# Patient Record
Sex: FEMALE | Race: WHITE | NOT HISPANIC OR LATINO | Employment: FULL TIME | URBAN - METROPOLITAN AREA
[De-identification: names, ages, dates, MRNs, and addresses within clinical notes are randomized per-mention and may not be internally consistent; named-entity substitution may affect disease eponyms.]

---

## 2017-04-19 ENCOUNTER — COMMUNICATION - HEALTHEAST (OUTPATIENT)
Dept: FAMILY MEDICINE | Facility: CLINIC | Age: 37
End: 2017-04-19

## 2017-04-20 ENCOUNTER — COMMUNICATION - HEALTHEAST (OUTPATIENT)
Dept: HEALTH INFORMATION MANAGEMENT | Facility: CLINIC | Age: 37
End: 2017-04-20

## 2017-05-02 ENCOUNTER — OFFICE VISIT - HEALTHEAST (OUTPATIENT)
Dept: FAMILY MEDICINE | Facility: CLINIC | Age: 37
End: 2017-05-02

## 2017-05-02 DIAGNOSIS — Z01.818 PREOP EXAMINATION: ICD-10-CM

## 2017-05-02 DIAGNOSIS — M67.431 GANGLION CYST OF DORSUM OF RIGHT WRIST: ICD-10-CM

## 2017-05-02 ASSESSMENT — MIFFLIN-ST. JEOR: SCORE: 1517.71

## 2017-08-17 ENCOUNTER — RECORDS - HEALTHEAST (OUTPATIENT)
Dept: ADMINISTRATIVE | Facility: OTHER | Age: 37
End: 2017-08-17

## 2017-08-18 ENCOUNTER — RECORDS - HEALTHEAST (OUTPATIENT)
Dept: ADMINISTRATIVE | Facility: OTHER | Age: 37
End: 2017-08-18

## 2017-09-11 ENCOUNTER — HOSPITAL ENCOUNTER (OUTPATIENT)
Dept: ULTRASOUND IMAGING | Facility: CLINIC | Age: 37
Discharge: HOME | End: 2017-09-11
Attending: OBSTETRICS & GYNECOLOGY

## 2017-09-11 ENCOUNTER — HOSPITAL ENCOUNTER (OUTPATIENT)
Dept: MAMMOGRAPHY | Facility: CLINIC | Age: 37
Discharge: HOME | End: 2017-09-11
Attending: OBSTETRICS & GYNECOLOGY

## 2017-09-11 DIAGNOSIS — N64.4 BREAST PAIN, RIGHT: ICD-10-CM

## 2017-09-11 DIAGNOSIS — N64.4 BREAST PAIN, LEFT: ICD-10-CM

## 2017-10-16 ENCOUNTER — RECORDS - HEALTHEAST (OUTPATIENT)
Dept: ADMINISTRATIVE | Facility: OTHER | Age: 37
End: 2017-10-16

## 2017-10-26 ENCOUNTER — OFFICE VISIT - HEALTHEAST (OUTPATIENT)
Dept: FAMILY MEDICINE | Facility: CLINIC | Age: 37
End: 2017-10-26

## 2017-10-26 DIAGNOSIS — R06.02 SHORTNESS OF BREATH: ICD-10-CM

## 2017-10-26 DIAGNOSIS — R41.3 MEMORY LOSS: ICD-10-CM

## 2017-10-26 DIAGNOSIS — R51.9 CHRONIC DAILY HEADACHE: ICD-10-CM

## 2017-11-06 ENCOUNTER — HOSPITAL ENCOUNTER (OUTPATIENT)
Dept: MRI IMAGING | Facility: CLINIC | Age: 37
Discharge: HOME | End: 2017-11-06
Attending: FAMILY MEDICINE

## 2017-11-06 ENCOUNTER — HOSPITAL ENCOUNTER (OUTPATIENT)
Dept: MRI IMAGING | Facility: CLINIC | Age: 37
Discharge: HOME | End: 2017-11-06
Attending: ORTHOPAEDIC SURGERY

## 2017-11-06 ENCOUNTER — RECORDS - HEALTHEAST (OUTPATIENT)
Dept: ADMINISTRATIVE | Facility: OTHER | Age: 37
End: 2017-11-06

## 2017-11-06 DIAGNOSIS — R41.3 MEMORY LOSS: ICD-10-CM

## 2017-11-06 DIAGNOSIS — R51.9 CHRONIC DAILY HEADACHE: ICD-10-CM

## 2017-11-06 DIAGNOSIS — M54.2 NECK PAIN: ICD-10-CM

## 2017-11-16 ENCOUNTER — OFFICE VISIT - HEALTHEAST (OUTPATIENT)
Dept: FAMILY MEDICINE | Facility: CLINIC | Age: 37
End: 2017-11-16

## 2017-11-16 DIAGNOSIS — R51.9 HEADACHE: ICD-10-CM

## 2017-11-20 ENCOUNTER — RECORDS - HEALTHEAST (OUTPATIENT)
Dept: ADMINISTRATIVE | Facility: OTHER | Age: 37
End: 2017-11-20

## 2017-11-20 LAB — ANA SER QL: 3.6 U

## 2017-11-21 ENCOUNTER — COMMUNICATION - HEALTHEAST (OUTPATIENT)
Dept: FAMILY MEDICINE | Facility: CLINIC | Age: 37
End: 2017-11-21

## 2017-11-21 DIAGNOSIS — R51.9 CHRONIC DAILY HEADACHE: ICD-10-CM

## 2017-11-21 DIAGNOSIS — R76.8 ELEVATED ANTINUCLEAR ANTIBODY (ANA) LEVEL: ICD-10-CM

## 2017-11-21 LAB — DNA (DS) ANTIBODY - HISTORICAL: 4 IU

## 2017-12-04 ENCOUNTER — OFFICE VISIT - HEALTHEAST (OUTPATIENT)
Dept: FAMILY MEDICINE | Facility: CLINIC | Age: 37
End: 2017-12-04

## 2017-12-04 DIAGNOSIS — R51.9 HEADACHE: ICD-10-CM

## 2017-12-22 ENCOUNTER — RECORDS - HEALTHEAST (OUTPATIENT)
Dept: ADMINISTRATIVE | Facility: OTHER | Age: 37
End: 2017-12-22

## 2018-01-15 ENCOUNTER — RECORDS - HEALTHEAST (OUTPATIENT)
Dept: GENERAL RADIOLOGY | Facility: CLINIC | Age: 38
End: 2018-01-15

## 2018-01-15 ENCOUNTER — OFFICE VISIT - HEALTHEAST (OUTPATIENT)
Dept: RHEUMATOLOGY | Facility: CLINIC | Age: 38
End: 2018-01-15

## 2018-01-15 ENCOUNTER — AMBULATORY - HEALTHEAST (OUTPATIENT)
Dept: RHEUMATOLOGY | Facility: CLINIC | Age: 38
End: 2018-01-15

## 2018-01-15 DIAGNOSIS — M79.7 FIBROMYALGIA: ICD-10-CM

## 2018-01-15 DIAGNOSIS — M54.2 CHRONIC NECK PAIN: ICD-10-CM

## 2018-01-15 DIAGNOSIS — G89.29 CHRONIC NECK PAIN: ICD-10-CM

## 2018-01-15 DIAGNOSIS — G89.29 OTHER CHRONIC PAIN: ICD-10-CM

## 2018-01-15 DIAGNOSIS — Z86.39 HISTORY OF VITAMIN D DEFICIENCY: ICD-10-CM

## 2018-01-15 DIAGNOSIS — M54.6 PAIN IN THORACIC SPINE: ICD-10-CM

## 2018-01-15 DIAGNOSIS — R20.2 RIGHT HAND PARESTHESIA: ICD-10-CM

## 2018-01-15 DIAGNOSIS — M54.50 LOW BACK PAIN: ICD-10-CM

## 2018-01-15 DIAGNOSIS — M25.50 MULTIPLE JOINT PAIN: ICD-10-CM

## 2018-01-15 DIAGNOSIS — R70.0 ELEVATED SED RATE: ICD-10-CM

## 2018-01-15 DIAGNOSIS — G89.29 CHRONIC BILATERAL THORACIC BACK PAIN: ICD-10-CM

## 2018-01-15 DIAGNOSIS — M54.50 CHRONIC BILATERAL LOW BACK PAIN WITHOUT SCIATICA: ICD-10-CM

## 2018-01-15 DIAGNOSIS — R76.8 ANA POSITIVE: ICD-10-CM

## 2018-01-15 DIAGNOSIS — M25.50 PAIN IN UNSPECIFIED JOINT: ICD-10-CM

## 2018-01-15 DIAGNOSIS — G89.29 CHRONIC BILATERAL LOW BACK PAIN WITHOUT SCIATICA: ICD-10-CM

## 2018-01-15 DIAGNOSIS — M54.6 CHRONIC BILATERAL THORACIC BACK PAIN: ICD-10-CM

## 2018-01-15 DIAGNOSIS — N96 HISTORY OF MULTIPLE MISCARRIAGES: ICD-10-CM

## 2018-01-15 LAB
ALBUMIN UR-MCNC: NEGATIVE MG/DL
APPEARANCE UR: CLEAR
BILIRUB UR QL STRIP: NEGATIVE
C REACTIVE PROTEIN LHE: 0.2 MG/DL (ref 0–0.8)
COLOR UR AUTO: YELLOW
ERYTHROCYTE [SEDIMENTATION RATE] IN BLOOD BY WESTERGREN METHOD: 34 MM/HR (ref 0–20)
GLUCOSE UR STRIP-MCNC: NEGATIVE MG/DL
HGB UR QL STRIP: NEGATIVE
KETONES UR STRIP-MCNC: NEGATIVE MG/DL
LEUKOCYTE ESTERASE UR QL STRIP: NEGATIVE
NITRATE UR QL: NEGATIVE
PH UR STRIP: 7 [PH] (ref 5–8)
RHEUMATOID FACT SERPL-ACNC: <15 IU/ML (ref 0–30)
SP GR UR STRIP: 1.02 (ref 1–1.03)
UROBILINOGEN UR STRIP-ACNC: NORMAL

## 2018-01-15 ASSESSMENT — MIFFLIN-ST. JEOR: SCORE: 1524.51

## 2018-01-16 LAB
ACE SERPL-CCNC: 34 U/L (ref 9–67)
B BURGDOR IGG+IGM SER QL: 0.1 INDEX VALUE
CCP AB SER IA-ACNC: 0.5 U/ML

## 2018-01-17 LAB
DRVVT, LUPUS ANTICOAGULANT - HISTORICAL: 37 SEC
HLA-B27 RESULT - HISTORICAL: NEGATIVE
INTERPRETATION: NORMAL

## 2018-01-18 ENCOUNTER — COMMUNICATION - HEALTHEAST (OUTPATIENT)
Dept: RHEUMATOLOGY | Facility: CLINIC | Age: 38
End: 2018-01-18

## 2018-01-18 DIAGNOSIS — R93.7 ABNORMAL X-RAY OF SPINE: ICD-10-CM

## 2018-02-13 ENCOUNTER — RECORDS - HEALTHEAST (OUTPATIENT)
Dept: ADMINISTRATIVE | Facility: OTHER | Age: 38
End: 2018-02-13

## 2018-03-21 ENCOUNTER — OFFICE VISIT - HEALTHEAST (OUTPATIENT)
Dept: RHEUMATOLOGY | Facility: CLINIC | Age: 38
End: 2018-03-21

## 2018-03-21 DIAGNOSIS — M70.62 TROCHANTERIC BURSITIS OF BOTH HIPS: ICD-10-CM

## 2018-03-21 DIAGNOSIS — E55.9 VITAMIN D DEFICIENCY: ICD-10-CM

## 2018-03-21 DIAGNOSIS — G89.29 CHRONIC PAIN OF RIGHT WRIST: ICD-10-CM

## 2018-03-21 DIAGNOSIS — R76.8 ANA POSITIVE: ICD-10-CM

## 2018-03-21 DIAGNOSIS — M25.531 CHRONIC PAIN OF RIGHT WRIST: ICD-10-CM

## 2018-03-21 DIAGNOSIS — M70.61 TROCHANTERIC BURSITIS OF BOTH HIPS: ICD-10-CM

## 2018-03-21 DIAGNOSIS — R70.0 ELEVATED SED RATE: ICD-10-CM

## 2018-03-21 DIAGNOSIS — M79.7 FIBROMYALGIA: ICD-10-CM

## 2018-03-21 DIAGNOSIS — M77.11 RIGHT LATERAL EPICONDYLITIS: ICD-10-CM

## 2018-03-21 DIAGNOSIS — M46.07 SPINAL ENTHESOPATHY, LUMBOSACRAL REGION (H): ICD-10-CM

## 2018-03-21 DIAGNOSIS — R45.89 FEELING DOWN: ICD-10-CM

## 2018-03-21 ASSESSMENT — MIFFLIN-ST. JEOR: SCORE: 1515.44

## 2018-03-26 ENCOUNTER — OFFICE VISIT - HEALTHEAST (OUTPATIENT)
Dept: RHEUMATOLOGY | Facility: CLINIC | Age: 38
End: 2018-03-26

## 2018-03-26 DIAGNOSIS — M70.61 TROCHANTERIC BURSITIS OF BOTH HIPS: ICD-10-CM

## 2018-03-26 DIAGNOSIS — M70.62 TROCHANTERIC BURSITIS OF BOTH HIPS: ICD-10-CM

## 2018-03-26 DIAGNOSIS — M46.07 SPINAL ENTHESOPATHY, LUMBOSACRAL REGION (H): ICD-10-CM

## 2018-04-09 ENCOUNTER — COMMUNICATION - HEALTHEAST (OUTPATIENT)
Dept: FAMILY MEDICINE | Facility: CLINIC | Age: 38
End: 2018-04-09

## 2018-04-09 DIAGNOSIS — M79.7 FIBROMYALGIA: ICD-10-CM

## 2018-04-16 ENCOUNTER — RECORDS - HEALTHEAST (OUTPATIENT)
Dept: ADMINISTRATIVE | Facility: OTHER | Age: 38
End: 2018-04-16

## 2018-04-23 ENCOUNTER — OFFICE VISIT - HEALTHEAST (OUTPATIENT)
Dept: RHEUMATOLOGY | Facility: CLINIC | Age: 38
End: 2018-04-23

## 2018-04-23 ENCOUNTER — OFFICE VISIT - HEALTHEAST (OUTPATIENT)
Dept: PSYCHOLOGY | Facility: CLINIC | Age: 38
End: 2018-04-23

## 2018-04-23 DIAGNOSIS — M79.7 FIBROMYALGIA: ICD-10-CM

## 2018-04-23 DIAGNOSIS — F43.23 ADJUSTMENT DISORDER WITH MIXED ANXIETY AND DEPRESSED MOOD: ICD-10-CM

## 2018-07-16 ENCOUNTER — AMBULATORY - HEALTHEAST (OUTPATIENT)
Dept: LAB | Facility: CLINIC | Age: 38
End: 2018-07-16

## 2018-07-16 DIAGNOSIS — M79.7 FIBROMYALGIA: ICD-10-CM

## 2018-07-16 DIAGNOSIS — R70.0 ELEVATED SED RATE: ICD-10-CM

## 2018-07-16 DIAGNOSIS — E55.9 VITAMIN D DEFICIENCY: ICD-10-CM

## 2018-07-16 LAB
ALT SERPL W P-5'-P-CCNC: 21 U/L (ref 0–45)
AST SERPL W P-5'-P-CCNC: 21 U/L (ref 0–40)
C REACTIVE PROTEIN LHE: 0.2 MG/DL (ref 0–0.8)
CREAT SERPL-MCNC: 0.78 MG/DL (ref 0.6–1.1)
ERYTHROCYTE [SEDIMENTATION RATE] IN BLOOD BY WESTERGREN METHOD: 32 MM/HR (ref 0–20)
GFR SERPL CREATININE-BSD FRML MDRD: >60 ML/MIN/1.73M2

## 2018-07-17 LAB — 25(OH)D3 SERPL-MCNC: 25 NG/ML (ref 30–80)

## 2018-07-23 ENCOUNTER — COMMUNICATION - HEALTHEAST (OUTPATIENT)
Dept: RHEUMATOLOGY | Facility: CLINIC | Age: 38
End: 2018-07-23

## 2018-07-23 ENCOUNTER — OFFICE VISIT - HEALTHEAST (OUTPATIENT)
Dept: RHEUMATOLOGY | Facility: CLINIC | Age: 38
End: 2018-07-23

## 2018-07-23 DIAGNOSIS — M54.6 CHRONIC BILATERAL THORACIC BACK PAIN: ICD-10-CM

## 2018-07-23 DIAGNOSIS — G89.29 CHRONIC BILATERAL THORACIC BACK PAIN: ICD-10-CM

## 2018-07-23 DIAGNOSIS — M25.561 CHRONIC PAIN OF RIGHT KNEE: ICD-10-CM

## 2018-07-23 DIAGNOSIS — M51.44 SCHMORL'S NODES OF THORACIC REGION: ICD-10-CM

## 2018-07-23 DIAGNOSIS — F40.240 CLAUSTROPHOBIA: ICD-10-CM

## 2018-07-23 DIAGNOSIS — R20.2 PARESTHESIA OF RIGHT ARM: ICD-10-CM

## 2018-07-23 DIAGNOSIS — E55.9 VITAMIN D DEFICIENCY: ICD-10-CM

## 2018-07-23 DIAGNOSIS — G89.29 CHRONIC PAIN OF RIGHT KNEE: ICD-10-CM

## 2018-07-23 DIAGNOSIS — R70.0 ESR RAISED: ICD-10-CM

## 2018-07-23 DIAGNOSIS — M79.7 FIBROMYALGIA: ICD-10-CM

## 2018-07-23 DIAGNOSIS — R76.8 ANA POSITIVE: ICD-10-CM

## 2018-07-23 LAB — ERYTHROCYTE [SEDIMENTATION RATE] IN BLOOD BY WESTERGREN METHOD: 32 MM/HR (ref 0–20)

## 2018-07-23 ASSESSMENT — MIFFLIN-ST. JEOR: SCORE: 1492.76

## 2018-07-26 ENCOUNTER — HOSPITAL ENCOUNTER (OUTPATIENT)
Dept: MRI IMAGING | Facility: CLINIC | Age: 38
Discharge: HOME | End: 2018-07-26
Attending: INTERNAL MEDICINE

## 2018-07-26 DIAGNOSIS — G89.29 CHRONIC BILATERAL THORACIC BACK PAIN: ICD-10-CM

## 2018-07-26 DIAGNOSIS — M51.44 SCHMORL'S NODES OF THORACIC REGION: ICD-10-CM

## 2018-07-26 DIAGNOSIS — M54.6 CHRONIC BILATERAL THORACIC BACK PAIN: ICD-10-CM

## 2018-07-26 DIAGNOSIS — R20.2 PARESTHESIA OF RIGHT ARM: ICD-10-CM

## 2018-08-06 ENCOUNTER — COMMUNICATION - HEALTHEAST (OUTPATIENT)
Dept: RHEUMATOLOGY | Facility: CLINIC | Age: 38
End: 2018-08-06

## 2018-08-06 DIAGNOSIS — R20.2 PARESTHESIAS: ICD-10-CM

## 2018-08-06 DIAGNOSIS — M54.6 THORACIC BACK PAIN: ICD-10-CM

## 2018-08-06 DIAGNOSIS — M51.44 SCHMORL'S NODES OF THE THORACIC REGION: ICD-10-CM

## 2018-08-21 ENCOUNTER — RECORDS - HEALTHEAST (OUTPATIENT)
Dept: ADMINISTRATIVE | Facility: OTHER | Age: 38
End: 2018-08-21

## 2018-09-09 ENCOUNTER — HOSPITAL ENCOUNTER (EMERGENCY)
Facility: CLINIC | Age: 38
Discharge: HOME | End: 2018-09-09
Attending: EMERGENCY MEDICINE | Admitting: EMERGENCY MEDICINE
Payer: COMMERCIAL

## 2018-09-09 VITALS
HEART RATE: 87 BPM | OXYGEN SATURATION: 100 % | RESPIRATION RATE: 20 BRPM | SYSTOLIC BLOOD PRESSURE: 118 MMHG | DIASTOLIC BLOOD PRESSURE: 78 MMHG

## 2018-09-09 DIAGNOSIS — H57.02 PUPIL ASYMMETRY: ICD-10-CM

## 2018-09-09 DIAGNOSIS — S05.91XA EYE INJURY, NON-PENETRATING, RIGHT, INITIAL ENCOUNTER: ICD-10-CM

## 2018-09-09 DIAGNOSIS — S05.01XA ABRASION OF RIGHT CORNEA, INITIAL ENCOUNTER: ICD-10-CM

## 2018-09-09 PROCEDURE — 99283 EMERGENCY DEPT VISIT LOW MDM: CPT | Mod: Z6 | Performed by: EMERGENCY MEDICINE

## 2018-09-09 PROCEDURE — 25000125 ZZHC RX 250: Performed by: EMERGENCY MEDICINE

## 2018-09-09 PROCEDURE — 99283 EMERGENCY DEPT VISIT LOW MDM: CPT

## 2018-09-09 PROCEDURE — 25000132 ZZH RX MED GY IP 250 OP 250 PS 637: Performed by: EMERGENCY MEDICINE

## 2018-09-09 PROCEDURE — 25000125 ZZHC RX 250: Performed by: FAMILY MEDICINE

## 2018-09-09 RX ORDER — TETRACAINE HYDROCHLORIDE 5 MG/ML
1-2 SOLUTION OPHTHALMIC ONCE
Status: COMPLETED | OUTPATIENT
Start: 2018-09-09 | End: 2018-09-09

## 2018-09-09 RX ORDER — TETRACAINE HYDROCHLORIDE 5 MG/ML
2 SOLUTION OPHTHALMIC ONCE
Status: DISCONTINUED | OUTPATIENT
Start: 2018-09-09 | End: 2018-09-09 | Stop reason: HOSPADM

## 2018-09-09 RX ORDER — OXYCODONE AND ACETAMINOPHEN 5; 325 MG/1; MG/1
2 TABLET ORAL EVERY 4 HOURS PRN
Status: DISCONTINUED | OUTPATIENT
Start: 2018-09-09 | End: 2018-09-09 | Stop reason: HOSPADM

## 2018-09-09 RX ORDER — CYCLOPENTOLATE HYDROCHLORIDE 10 MG/ML
3 SOLUTION/ DROPS OPHTHALMIC ONCE
Status: COMPLETED | OUTPATIENT
Start: 2018-09-09 | End: 2018-09-09

## 2018-09-09 RX ORDER — GENTAMICIN SULFATE 3 MG/ML
1 SOLUTION/ DROPS OPHTHALMIC
Status: DISCONTINUED | OUTPATIENT
Start: 2018-09-09 | End: 2018-09-09 | Stop reason: HOSPADM

## 2018-09-09 RX ADMIN — GENTAMICIN SULFATE 1 DROP: 3 SOLUTION OPHTHALMIC at 19:53

## 2018-09-09 RX ADMIN — OXYCODONE HYDROCHLORIDE AND ACETAMINOPHEN 2 TABLET: 5; 325 TABLET ORAL at 19:04

## 2018-09-09 RX ADMIN — CYCLOPENTOLATE HYDROCHLORIDE 3 DROP: 10 SOLUTION/ DROPS OPHTHALMIC at 18:35

## 2018-09-09 RX ADMIN — TETRACAINE HYDROCHLORIDE 1 DROP: 5 SOLUTION OPHTHALMIC at 17:01

## 2018-09-09 ASSESSMENT — ENCOUNTER SYMPTOMS
ABDOMINAL PAIN: 0
FEVER: 0
COLOR CHANGE: 0
HEADACHES: 0
NECK STIFFNESS: 0
DIFFICULTY URINATING: 0
ARTHRALGIAS: 0
SHORTNESS OF BREATH: 0
CONFUSION: 0
EYE REDNESS: 0

## 2018-09-09 NOTE — ED AVS SNAPSHOT
[facility] Emergency Department    [address] [address]    Phone:  [phone]    Fax:  [phone]                                       Sridevi Donaldson   MRN: [id number]    Department:  [facility] Emergency Department   Date of Visit:  9/9/2018           Patient Information     Date Of Birth          1980        Your diagnoses for this visit were:     Eye injury, non-penetrating, right, initial encounter     Abrasion of right cornea, initial encounter     Pupil asymmetry        You were seen by Mo Reeves DO.        Discharge Instructions       Keep eye shield applied.  Remove 3 times daily to allow for application of the gentamicin ophthalmic antibiotic drops.  Apply 2 drops into the eye then reapply the eye shield.  May use extra strength Tylenol for discomfort.  Avoid light it will cause increased pain/sensitivity.  Contact your eye care specialist tomorrow morning for appointment tomorrow.  If you are not able to get an appointment with your eye care specialist tomorrow please call [facility] which is within the [facility] facility.  Phone number is [phone].  Request a work in visit on Monday for follow-up to emergency room visit.( blunt eye injury)        24 Hour Appointment Hotline       To make an appointment at any [facility] clinic, call [phone] ([phone]). If you don't have a family doctor or clinic, we will help you find one. [facility] clinics are conveniently located to serve the needs of you and your family.             Review of your medicines      Notice     You have not been prescribed any medications.            Orders Needing Specimen Collection     None      Pending Results     No orders found from 9/7/2018 to 9/10/2018.            Pending Culture Results     No orders found from 9/7/2018 to 9/10/2018.            Pending Results Instructions     If you had any lab results that were not finalized at  "the time of your Discharge, you can call the ED Lab Result RN at [phone]. You will be contacted by this team for any positive Lab results or changes in treatment. The nurses are available 7 days a week from 10A to 6:30P.  You can leave a message 24 hours per day and they will return your call.        Test Results From Your Hospital Stay               Thank you for choosing [facility]       Thank you for choosing [facility] for your care. Our goal is always to provide you with excellent care. Hearing back from our patients is one way we can continue to improve our services. Please take a few minutes to complete the written survey that you may receive in the mail after you visit with us. Thank you!        [employer] Information     [employer] lets you send messages to your doctor, view your test results, renew your prescriptions, schedule appointments and more. To sign up, go to [email or web address] . Click on \"Log in\" on the left side of the screen, which will take you to the Welcome page. Then click on \"Sign up Now\" on the right side of the page.     You will be asked to enter the access code listed below, as well as some personal information. Please follow the directions to create your username and password.     Your access code is: [email or web address]  Expires: 2018  7:46 PM     Your access code will  in 90 days. If you need help or a new code, please call your [facility] clinic or [phone].        Care EveryWhere ID     This is your Care EveryWhere ID. This could be used by other organizations to access your [facility] medical records  [id number]        Equal Access to Services     YULIA TREVIZO : Hadii polly Frey, waantonio thrasheradaha, qaybta kaaljudith gautam . So [facility] [phone].    ATENCIÓN: Si habla español, tiene a murray disposición servicios gratuitos de asistencia lingüística. Llame al [phone].    We comply with applicable federal civil rights laws " and [place] laws. We do not discriminate on the basis of race, color, national origin, age, disability, sex, sexual orientation, or gender identity.            After Visit Summary       This is your record. Keep this with you and show to your community pharmacist(s) and doctor(s) at your next visit.

## 2018-09-09 NOTE — ED NOTES
Patient right eye bruised red and swollen. She is unable to do a visual acuity. Tetracaine instilled and lights dimmed. Spouse at bedside.

## 2018-09-09 NOTE — LETTER
September 9, 2018      To Whom It May Concern:      Sridevi Donaldson was seen in our Emergency Department today, 09/09/18.  I expect her condition to improve over the next 1-2 days.  She may return to work/school when improved.    Sincerely,      Dr. Heriberto Mosquera RN

## 2018-09-09 NOTE — ED NOTES
Patient states she has a history of Migraines and is seeing a neurologist she feels as though a migraine is coming on. MD made aware.

## 2018-09-09 NOTE — ED AVS SNAPSHOT
[facility] Emergency Department    [address] [address]    Phone:  [phone]    Fax:  [phone]                                       Sridevi Donaldson   MRN: [id number]    Department:  [facility] Emergency Department   Date of Visit:  9/9/2018           After Visit Summary Signature Page     I have received my discharge instructions, and my questions have been answered. I have discussed any challenges I see with this plan with the nurse or doctor.    ..........................................................................................................................................  Patient/Patient Representative Signature      ..........................................................................................................................................  Patient Representative Print Name and Relationship to Patient    ..................................................               ................................................  Date                                            Time    ..........................................................................................................................................  Reviewed by Signature/Title    ...................................................              ..............................................  Date                                                            Time          22EPIC Rev 08/18
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
## 2018-09-10 NOTE — ED NOTES
Ice pack applied to patient eye per request. Awaiting dilation of eye for exam. Pharmacy did call to change antibiotic ointment to drops, as pharmacy does not carry ointment. Will update MD.

## 2018-09-10 NOTE — DISCHARGE INSTRUCTIONS
Keep eye shield applied.  Remove 3 times daily to allow for application of the gentamicin ophthalmic antibiotic drops.  Apply 2 drops into the eye then reapply the eye shield.  May use extra strength Tylenol for discomfort.  Avoid light it will cause increased pain/sensitivity.  Contact your eye care specialist tomorrow morning for appointment tomorrow.  If you are not able to get an appointment with your eye care specialist tomorrow please call [facility] which is within the [facility] facility.  Phone number is [phone].  Request a work in visit on Monday for follow-up to emergency room visit.( blunt eye injury)

## 2018-10-09 ENCOUNTER — APPOINTMENT (OUTPATIENT)
Dept: OCCUPATIONAL MEDICINE | Facility: CLINIC | Age: 38
End: 2018-10-09

## 2018-10-09 PROCEDURE — 97799 UNLISTED PHYSCL MED/REHAB PX: CPT | Performed by: FAMILY MEDICINE

## 2018-10-09 PROCEDURE — 99000 SPECIMEN HANDLING OFFICE-LAB: CPT | Performed by: FAMILY MEDICINE

## 2018-10-09 PROCEDURE — 99499 UNLISTED E&M SERVICE: CPT | Performed by: FAMILY MEDICINE

## 2018-10-11 ENCOUNTER — COMMUNICATION - HEALTHEAST (OUTPATIENT)
Dept: FAMILY MEDICINE | Facility: CLINIC | Age: 38
End: 2018-10-11

## 2019-04-11 ENCOUNTER — HOSPITAL ENCOUNTER (EMERGENCY)
Facility: CLINIC | Age: 39
Discharge: HOME | End: 2019-04-11
Attending: FAMILY MEDICINE | Admitting: FAMILY MEDICINE
Payer: COMMERCIAL

## 2019-04-11 VITALS
SYSTOLIC BLOOD PRESSURE: 106 MMHG | WEIGHT: 200 LBS | HEIGHT: 64 IN | OXYGEN SATURATION: 95 % | BODY MASS INDEX: 34.15 KG/M2 | RESPIRATION RATE: 16 BRPM | HEART RATE: 77 BPM | TEMPERATURE: 97.8 F | DIASTOLIC BLOOD PRESSURE: 69 MMHG

## 2019-04-11 DIAGNOSIS — M53.3 SI (SACROILIAC) JOINT DYSFUNCTION: ICD-10-CM

## 2019-04-11 PROCEDURE — 25000131 ZZH RX MED GY IP 250 OP 636 PS 637: Performed by: FAMILY MEDICINE

## 2019-04-11 PROCEDURE — 25000132 ZZH RX MED GY IP 250 OP 250 PS 637: Performed by: FAMILY MEDICINE

## 2019-04-11 PROCEDURE — 99283 EMERGENCY DEPT VISIT LOW MDM: CPT | Performed by: FAMILY MEDICINE

## 2019-04-11 PROCEDURE — 99284 EMERGENCY DEPT VISIT MOD MDM: CPT | Mod: Z6 | Performed by: FAMILY MEDICINE

## 2019-04-11 RX ORDER — LIDOCAINE 4 G/G
1 PATCH TOPICAL
Status: DISCONTINUED | OUTPATIENT
Start: 2019-04-11 | End: 2019-04-11 | Stop reason: HOSPADM

## 2019-04-11 RX ORDER — IBUPROFEN 400 MG/1
800 TABLET, FILM COATED ORAL ONCE
Status: COMPLETED | OUTPATIENT
Start: 2019-04-11 | End: 2019-04-11

## 2019-04-11 RX ORDER — CYCLOBENZAPRINE HCL 10 MG
10 TABLET ORAL
Qty: 10 TABLET | Refills: 0 | Status: SHIPPED | OUTPATIENT
Start: 2019-04-11 | End: 2021-08-30

## 2019-04-11 RX ORDER — OXYCODONE AND ACETAMINOPHEN 5; 325 MG/1; MG/1
2 TABLET ORAL ONCE
Status: COMPLETED | OUTPATIENT
Start: 2019-04-11 | End: 2019-04-11

## 2019-04-11 RX ORDER — OXYCODONE HYDROCHLORIDE 5 MG/1
5 TABLET ORAL EVERY 6 HOURS PRN
Qty: 6 TABLET | Refills: 0 | Status: SHIPPED | OUTPATIENT
Start: 2019-04-11 | End: 2021-08-30

## 2019-04-11 RX ADMIN — OXYCODONE HYDROCHLORIDE AND ACETAMINOPHEN 2 TABLET: 5; 325 TABLET ORAL at 06:55

## 2019-04-11 RX ADMIN — DEXAMETHASONE 10 MG: 2 TABLET ORAL at 08:22

## 2019-04-11 RX ADMIN — IBUPROFEN 800 MG: 400 TABLET ORAL at 06:55

## 2019-04-11 RX ADMIN — LIDOCAINE 1 PATCH: 560 PATCH PERCUTANEOUS; TOPICAL; TRANSDERMAL at 08:22

## 2019-04-11 ASSESSMENT — ENCOUNTER SYMPTOMS
ABDOMINAL DISTENTION: 0
BLOOD IN STOOL: 0
SORE THROAT: 0
WEAKNESS: 1
PALPITATIONS: 0
VOMITING: 0
NAUSEA: 0
SINUS PRESSURE: 0
DIAPHORESIS: 0
SHORTNESS OF BREATH: 0
DIARRHEA: 1
FEVER: 0
ABDOMINAL PAIN: 0
CHILLS: 0
CONSTIPATION: 0
FREQUENCY: 0
WHEEZING: 0
COUGH: 0
BACK PAIN: 1
DYSURIA: 0
HEADACHES: 0

## 2019-04-11 ASSESSMENT — MIFFLIN-ST. JEOR: SCORE: 1557.19

## 2019-04-11 NOTE — ED AVS SNAPSHOT
[facility] Emergency Department  [address] [address]  Phone:  [phone]  Fax:  [phone]                                    Sridevi Donaldson   MRN: [id number]    Department:  [facility] Emergency Department   Date of Visit:  4/11/2019           After Visit Summary Signature Page    I have received my discharge instructions, and my questions have been answered. I have discussed any challenges I see with this plan with the nurse or doctor.    ..........................................................................................................................................  Patient/Patient Representative Signature      ..........................................................................................................................................  Patient Representative Print Name and Relationship to Patient    ..................................................               ................................................  Date                                   Time    ..........................................................................................................................................  Reviewed by Signature/Title    ...................................................              ..............................................  Date                                               Time          22EPIC Rev 08/18
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
## 2019-04-11 NOTE — DISCHARGE INSTRUCTIONS
ICD-10-CM    1. SI (sacroiliac) joint dysfunction vs disc M53.3 [facility] REFERRAL    You were given decadron, percocet, ibuprofen, lidocaine patch here.   Remove the lidocaine patch in 12 hours and may reaaply OTC 4% patch every 12 of 24 hours.  Maintain back range of motion.  Follow-up physical therapy tomorrow, follow-up primary care with Dr. Rosario tomorrow.  Return immed for numbness inner thighs, foot drop, incontinence urine/stool.  Radiation of pain below the knee or leg weakness not due to pain also requires urgent re-evaluation.  Take ibuprofen 600 mg every 6 hours with food or milk scheduled.  Take tylenol 1000 mg every 6 hours scheduled.  Take flexeril for spasm interfering with sleep.  Take oxycodone for breakthrough pain.

## 2019-04-11 NOTE — LETTER
April 11, 2019      To Whom It May Concern:      Sridevi Donaldson was seen in our Emergency Department today, 04/11/19.  I expect her condition to improve over the next 5 days.  She may return to work/school when improved, expected back as of 4/15/2019.   As of that time, no lifting >10-20 pounds, no overhead work, no bending, stooping for 7 days.     Sincerely,        Rashaun Olvera MD
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
## 2019-04-12 ENCOUNTER — HOSPITAL ENCOUNTER (OUTPATIENT)
Dept: PHYSICAL THERAPY | Facility: CLINIC | Age: 39
Setting detail: THERAPIES SERIES
End: 2019-04-12
Attending: FAMILY MEDICINE
Payer: COMMERCIAL

## 2019-04-12 ENCOUNTER — OFFICE VISIT (OUTPATIENT)
Dept: FAMILY MEDICINE | Facility: CLINIC | Age: 39
End: 2019-04-12
Payer: COMMERCIAL

## 2019-04-12 ENCOUNTER — TELEPHONE (OUTPATIENT)
Dept: FAMILY MEDICINE | Facility: CLINIC | Age: 39
End: 2019-04-12

## 2019-04-12 VITALS
DIASTOLIC BLOOD PRESSURE: 68 MMHG | TEMPERATURE: 98 F | HEART RATE: 100 BPM | RESPIRATION RATE: 13 BRPM | OXYGEN SATURATION: 97 % | BODY MASS INDEX: 34.67 KG/M2 | SYSTOLIC BLOOD PRESSURE: 114 MMHG | WEIGHT: 202 LBS

## 2019-04-12 DIAGNOSIS — M53.3 SACROILIAC JOINT PAIN: Primary | ICD-10-CM

## 2019-04-12 DIAGNOSIS — M53.3 SI (SACROILIAC) JOINT DYSFUNCTION: ICD-10-CM

## 2019-04-12 PROCEDURE — 97014 ELECTRIC STIMULATION THERAPY: CPT | Mod: GP | Performed by: PHYSICAL THERAPIST

## 2019-04-12 PROCEDURE — 97110 THERAPEUTIC EXERCISES: CPT | Mod: GP | Performed by: PHYSICAL THERAPIST

## 2019-04-12 PROCEDURE — 97162 PT EVAL MOD COMPLEX 30 MIN: CPT | Mod: GP | Performed by: PHYSICAL THERAPIST

## 2019-04-12 PROCEDURE — 99213 OFFICE O/P EST LOW 20 MIN: CPT | Performed by: FAMILY MEDICINE

## 2019-04-12 RX ORDER — OXYCODONE HYDROCHLORIDE 5 MG/1
5 TABLET ORAL EVERY 6 HOURS PRN
Qty: 6 TABLET | Refills: 0 | Status: CANCELLED | OUTPATIENT
Start: 2019-04-12
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
## 2019-04-12 NOTE — PROGRESS NOTES
[facility]    OUTPATIENT PHYSICAL THERAPY ORTHOPEDIC EVALUATION  PLAN OF TREATMENT FOR OUTPATIENT REHABILITATION  (COMPLETE FOR INITIAL CLAIMS ONLY)  Patient's Last Name, First Name, M.I.  YOB: 1980  Donaldson,Sridevi       Provider s Name:  [facility]   Medical Record No.  [id number]   Start of Care Date:  04/12/19   Onset Date:  04/10/19   Type:     _X__PT   ___OT   ___SLP Medical Diagnosis:  SI joint dysfunction/LBP     PT Diagnosis:  L Posterior Ilium, Unable to rule out disc.    Visits from SOC:  1      _________________________________________________________________________________  Plan of Treatment/Functional Goals:     MT, MET, Jt mobs, STM, Develop HEP, Stabilization.      E.Stim w/Ice for RAdicular sx's, Pain. Has TENS unit at home.   Goals  Goal Identifier: 1  Goal Description: STG: Pt will be able to put on her shoes and socks independently and w/o pain.   Target Date: 05/06/19    Goal Identifier: 2  Goal Description: STG: Pt will be able to walk for 1/4 mile  Target Date: 05/06/19    Goal Identifier: 3  Goal Description: STG: Pt will be able to sleep up to 6 hours sleep/night.   Target Date: 05/06/19    Goal Identifier: 4  Goal Description: LTG: Pt will be able to demosntrate a HEP to restrenghtening core and decrease chance of reoccurance.   Target Date: 05/17/19    Therapy Frequency:  3 times/week  Predicted Duration of Therapy Intervention:  5 weeks, decreasing freqyency as appropriate.     Vale Reyes, PT, MTC                 I CERTIFY THE NEED FOR THESE SERVICES FURNISHED UNDER        THIS PLAN OF TREATMENT AND WHILE UNDER MY CARE     (Physician co-signature of this document indicates review and certification of the therapy plan).                     Certification Date From:  04/12/19   Certification Date To:  05/06/19    Referring Provider:  Rashaun Olvera and Marlene,     Initial Assessment        See Epic Evaluation Start of Care  Date: 04/12/19

## 2019-04-12 NOTE — PROGRESS NOTES
SUBJECTIVE:   Sridevi Donaldson is a 39 year old female who presents to clinic today for the following   health issues:        ED/UC Followup:    Facility:  [facility]  Date of visit: 4/11/19  Reason for visit: SI joint pain and herniated disc  Current Status: having pain, started PT today         Patient is a 39-year-old female who has a history of fibromyalgia and multiple musculoskeletal issues she is here to establish care and to follow-up from an emergency room visit.  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  She reports that at some point she was unable to walk.  The pain was sharp and severe she was seen in the emergency room workup was essentially normal.  She was diagnosed with sacroiliac joint pain and given some pain medication and also some muscle relaxants.  She was also referred to physical therapy which she started today. She will need a note for work     Additional history: as documented    Reviewed  and updated as needed this visit by clinical staff         Reviewed and updated as needed this visit by Provider         There is no problem list on file for this patient.    Past Surgical History:   Procedure Laterality Date     DILATION AND CURETTAGE       foot reconstruction Right      ovarian cyst removal         Social History     Tobacco Use     Smoking status: Never Smoker     Smokeless tobacco: Never Used   Substance Use Topics     Alcohol use: Yes     Comment: occasional     History reviewed. No pertinent family history.      Current Outpatient Medications   Medication Sig Dispense Refill     cyclobenzaprine (FLEXERIL) 10 MG tablet Take 1 tablet (10 mg) by mouth nightly as needed for muscle spasms 10 tablet 0     oxyCODONE (ROXICODONE) 5 MG tablet Take 1 tablet (5 mg) by mouth every 6 hours as needed for pain 6 tablet 0     Allergies   Allergen Reactions     Morphine Other (See Comments)      Red veins, gone after benadryl     Penicillin G Hives     BP Readings from Last 3 Encounters:   04/12/19 114/68   04/11/19 106/69   09/09/18 118/78    Wt Readings from Last 3 Encounters:   04/12/19 91.6 kg (202 lb)   04/11/19 90.7 kg (200 lb)                  Labs reviewed in EPIC    ROS:  Constitutional, HEENT, cardiovascular, pulmonary, gi and gu systems are negative, except as otherwise noted.    OBJECTIVE:     /68 (BP Location: Left arm, Patient Position: Chair)   Pulse 100   Temp 98  F (36.7  C) (Tympanic)   Resp 13   Wt 91.6 kg (202 lb)   LMP  (LMP Unknown)   SpO2 97%   BMI 34.67 kg/m    Body mass index is 34.67 kg/m .  GENERAL: healthy, alert and no distress  EYES: Eyes grossly normal to inspection, PERRL and conjunctivae and sclerae normal  HENT: ear canals and TM's normal, nose and mouth without ulcers or lesions  NECK: no adenopathy, no asymmetry, masses, or scars and thyroid normal to palpation  RESP: lungs clear to auscultation - no rales, rhonchi or wheezes  CV: regular rate and rhythm, normal S1 S2, no S3 or S4, no murmur, click or rub, no peripheral edema and peripheral pulses strong  MS: decreased range of motion and tenderness to palpation on left hip.     Diagnostic Test Results:      ASSESSMENT/PLAN:       ICD-10-CM    1. Sacroiliac joint pain M53.3 ORTHO  REFERRAL   Discussed with patient options such as cortisone shots referred to sports medicine , encouraged to continue with physical therapy .    FUTURE APPOINTMENTS:       - Follow-up visit     Jay Rosario MD  [facility] [place]

## 2019-04-12 NOTE — TELEPHONE ENCOUNTER
Reason for Call:  prescription    Detailed comments: Pt is calling to refill her oxycodone.  Was in to see the PCP yesterday and states she forgot to ask.  Down to two tablets.  Would prefer [facility] to fill, but will  the prescription.    Phone Number Patient can be reached at: Home number on file [phone] (home)    Best Time: any    Can we leave a detailed message on this number? YES    Call taken on 4/12/2019 at 1:12 PM by Sarah Baig

## 2019-04-12 NOTE — LETTER
April 12, 2019      Sridevi Donaldson  [address] [place] [id number]        To Whom It May Concern:    Sridevi Donaldson  was seen on 4/12/2019  Please excuse her until 4/18/2019  due to illness.        Sincerely,        Jay Rosario MD

## 2019-04-12 NOTE — PATIENT INSTRUCTIONS
Patient Education     Understanding Sacroiliac Strain    A joint is a place where 2 bones meet. The 2 sacroiliac joints are where the hip (iliac) bones meet the bottom part of the spine (sacrum). These joints are surrounded by muscle, connective tissue, and nerves. Normally, a sacroiliac joint (SIJ) does not move very much. But it can be pushed out of alignment. The tissues around an SIJ also can be stretched or torn. This can lead to pain in the low back.     How to say it  DRC-md-NI-davidson-ak strain   Causes of sacroiliac strain  Causes of SIJ strain can include:    Stress on the SIJ from lifting weight incorrectly    Poor body mechanics and posture during sports or work activities    Damage from degenerative diseases such as arthritis    Increased pressure on the SIJ from pregnancy  Symptoms of sacroiliac strain  Symptoms of SIJ strain may include:    Aching in the low back, buttocks, or upper leg    Pain that gets worse with movement or standing for a long time, and gets better with rest    Inability to move as freely as usual    Muscle spasms in the low back  Treating sacroiliac strain  Treatment focuses on reducing pain and avoiding further injury. Treatments may include:    Prescription or over-the-counter pain medicines. These help reduce pain and swelling.    Cold packs or heat packs. These help reduce pain and swelling.    Stretching and other exercises. These improve flexibility and strength.    Physical therapy. This may include exercises or other treatments.    An SIJ belt. This medical device is worn around the hips, to make the SIJ more stable and reduce pain.    Injections of medicine. This may relieve symptoms.  Possible complications of sacroiliac strain  If the cause of the pain is not addressed, symptoms may return or get worse. Follow your healthcare provider s instructions on lifestyle changes and treating your SIJ strain.     When to call your healthcare provider  Call your healthcare provider  right away if you have any of these:    Fever of 100.4 F (38 C) or higher, or as directed    Redness or swelling    Pain that gets worse    Symptoms that don t get better with prescribed medicines, or get worse    New symptoms   Date Last Reviewed: 3/10/2016    [id number] [employer]. [address] [place] [address]. All rights reserved. This information is not intended as a substitute for professional medical care. Always follow your healthcare professional's instructions.

## 2019-04-12 NOTE — TELEPHONE ENCOUNTER
Routing refill request to provider for review/approval because:  Drug not on the [facility] refill protocol     Patient was seen in clinic today and forgot to ask for further refill on narcotic.      JA SharmaN, RN

## 2019-04-15 ENCOUNTER — HOSPITAL ENCOUNTER (OUTPATIENT)
Dept: PHYSICAL THERAPY | Facility: CLINIC | Age: 39
Setting detail: THERAPIES SERIES
End: 2019-04-15
Attending: FAMILY MEDICINE
Payer: COMMERCIAL

## 2019-04-15 PROCEDURE — 97110 THERAPEUTIC EXERCISES: CPT | Mod: GP | Performed by: PHYSICAL THERAPIST

## 2019-04-17 ENCOUNTER — HOSPITAL ENCOUNTER (OUTPATIENT)
Dept: PHYSICAL THERAPY | Facility: CLINIC | Age: 39
Setting detail: THERAPIES SERIES
End: 2019-04-17
Attending: FAMILY MEDICINE
Payer: COMMERCIAL

## 2019-04-17 PROCEDURE — 97110 THERAPEUTIC EXERCISES: CPT | Mod: GP | Performed by: PHYSICAL THERAPIST

## 2019-04-19 ENCOUNTER — ANCILLARY PROCEDURE (OUTPATIENT)
Dept: GENERAL RADIOLOGY | Facility: CLINIC | Age: 39
End: 2019-04-19
Attending: PEDIATRICS
Payer: COMMERCIAL

## 2019-04-19 ENCOUNTER — HEALTH MAINTENANCE LETTER (OUTPATIENT)
Age: 39
End: 2019-04-19

## 2019-04-19 ENCOUNTER — OFFICE VISIT (OUTPATIENT)
Dept: ORTHOPEDICS | Facility: CLINIC | Age: 39
End: 2019-04-19
Payer: COMMERCIAL

## 2019-04-19 VITALS
WEIGHT: 202 LBS | BODY MASS INDEX: 34.49 KG/M2 | HEIGHT: 64 IN | DIASTOLIC BLOOD PRESSURE: 70 MMHG | SYSTOLIC BLOOD PRESSURE: 116 MMHG

## 2019-04-19 DIAGNOSIS — M54.42 CHRONIC BILATERAL LOW BACK PAIN WITH LEFT-SIDED SCIATICA: Primary | ICD-10-CM

## 2019-04-19 DIAGNOSIS — G89.29 CHRONIC LOWER BACK PAIN: ICD-10-CM

## 2019-04-19 DIAGNOSIS — G89.29 CHRONIC BILATERAL LOW BACK PAIN WITH LEFT-SIDED SCIATICA: Primary | ICD-10-CM

## 2019-04-19 DIAGNOSIS — M54.50 CHRONIC LOWER BACK PAIN: ICD-10-CM

## 2019-04-19 PROCEDURE — 72100 X-RAY EXAM L-S SPINE 2/3 VWS: CPT

## 2019-04-19 PROCEDURE — 99243 OFF/OP CNSLTJ NEW/EST LOW 30: CPT | Performed by: PEDIATRICS

## 2019-04-19 ASSESSMENT — MIFFLIN-ST. JEOR: SCORE: 1566.27

## 2019-04-19 NOTE — PATIENT INSTRUCTIONS
Plan:  - Today's Plan of Care:  Over the counter medication: Ibuprofen (Advil) maximum of 800mg three times a day with food  Continue with physical therapy    -We also discussed other future treatment options:  MRI of the lower back    Follow Up: 4-6 weeks    If you have any further questions for your physician or physician s care team you can call [phone] and use option 3 to leave a voice message. Calls received during business hours will be returned same day.
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
## 2019-04-24 ENCOUNTER — HOSPITAL ENCOUNTER (OUTPATIENT)
Dept: PHYSICAL THERAPY | Facility: CLINIC | Age: 39
Setting detail: THERAPIES SERIES
End: 2019-04-24
Attending: FAMILY MEDICINE
Payer: COMMERCIAL

## 2019-04-24 PROCEDURE — 97110 THERAPEUTIC EXERCISES: CPT | Mod: GP | Performed by: PHYSICAL THERAPIST

## 2019-04-24 PROCEDURE — 97014 ELECTRIC STIMULATION THERAPY: CPT | Mod: GP | Performed by: PHYSICAL THERAPIST

## 2019-04-29 ENCOUNTER — HOSPITAL ENCOUNTER (OUTPATIENT)
Dept: PHYSICAL THERAPY | Facility: CLINIC | Age: 39
Setting detail: THERAPIES SERIES
End: 2019-04-29
Attending: FAMILY MEDICINE
Payer: COMMERCIAL

## 2019-04-29 PROCEDURE — 97110 THERAPEUTIC EXERCISES: CPT | Mod: GP | Performed by: PHYSICAL THERAPIST

## 2019-05-08 ENCOUNTER — TELEPHONE (OUTPATIENT)
Dept: FAMILY MEDICINE | Facility: CLINIC | Age: 39
End: 2019-05-08

## 2019-05-08 NOTE — TELEPHONE ENCOUNTER
Panel Management Review      Patient has the following on her problem list:       Composite cancer screening  Chart review shows that this patient is due/due soon for the following Pap Smear  Summary:    Patient is due/failing the following:   PAP and PHYSICAL    Action needed:   Patient needs office visit for pap and pe .    Type of outreach:    Sent Veeboxt message.    Questions for provider review:    None                                                                                                                                    Bryanna Mercer CMA     Chart routed to Care Team .

## 2019-05-23 NOTE — TELEPHONE ENCOUNTER
Preventlyhart message has not been viewed.  Left message for patient to return call to the clinic or to view her Armasighthart.

## 2019-05-23 NOTE — TELEPHONE ENCOUNTER
Patient returned the call, reminded due for pap/pe.  She will call back to schedule or schedule through [facility].

## 2019-08-15 NOTE — PROGRESS NOTES
Outpatient Physical Therapy Discharge Note     Patient: Sridevi Donaldson  : 1980    Beginning/End Dates of Reporting Period:  19 to 19 when last seen. Discharge written on 8/15/2019.  Total # of Rx sessions: 5    Referring Provider: Rashaun Olvera Diagnosis: SI joint dysfunciton and LBP      Client Self Report: Doing better. Work going well.     Objective Measurements:  Objective Measure: Amy   Details: 4/15/19 Score 9.  INITIALLY: 9    Objective Measure: HARISH   Details: 19  Score 74. INITIALLY: 80     Objective Measure: Pelvic LB alignment   Details: 19  Pelvis normal. INITIALLY: R Post Ilium in supine     Objective Measure: MMT  Details: 19  NOT Reassessed. INITIALLY:  Hip Flex Pain B 4-, L Knee Flex 4 w/ P, Knee Ext L 4 w/ P, R 5- w/ P, DF, PF, Toe Ext 5.     Objective Measure: Special Tests   Details: SLR Negative B,      Outcome Measures (most recent score):  Amy STarT    Amy STarT       Goals:  Goal Identifier 1   Goal Description STG: Pt will be able to put on her shoes and socks independently and w/o pain.  19 MET    Target Date 19   Date Met  19   Progress:     Goal Identifier 2   Goal Description STG: Pt will be able to walk for 1/4 mile 19  MET    Target Date 19   Date Met  19   Progress:     Goal Identifier 3   Goal Description STG: Pt will be able to sleep up to 6 hours sleep/night.  19  MET    Target Date 19   Date Met  19   Progress:     Goal Identifier 4   Goal Description LTG: Pt will be able to demosntrate a HEP to restrenghtening core and decrease chance of reoccurance. 19  MET    Target Date 19   Date Met  19   Progress:     Progress Toward Goals:   Progress this reporting period: All goals have been met.     Plan:  Discharge from therapy.    Discharge:    Reason for Discharge: Patient has met all goals.  Patient chooses to discontinue therapy.  Patient has failed to schedule  further appointments.    Equipment Issued:      Discharge Plan: Patient to continue home program.  Pt to follow up w/MD as appropriate.  Vale Reyes, PT, [facility] (#[id number])  [facility]           [phone]  Fax          [phone]  Appt #      [phone]

## 2019-08-15 NOTE — ADDENDUM NOTE
Encounter addended by: Vale Reyes, PT on: 8/15/2019 1:16 PM   Actions taken: Flowsheet accepted, Sign clinical note, Episode resolved

## 2019-08-22 ENCOUNTER — TELEPHONE (OUTPATIENT)
Dept: FAMILY MEDICINE | Facility: CLINIC | Age: 39
End: 2019-08-22

## 2019-09-26 NOTE — TELEPHONE ENCOUNTER
Contacted the patient, reminded she is due for pap/pe.  She is living in [place] and will be going somewhere closer to home.  Reminded her to get in with any provider for this.

## 2019-12-25 NOTE — TELEPHONE ENCOUNTER
Panel Management Review      Patient has the following on her problem list:       Composite cancer screening  Chart review shows that this patient is due/due soon for the following Pap Smear  Summary:    Patient is due/failing the following:   PAP    Action needed:   Patient needs office visit for Physical and pap.    Type of outreach:    Sent Gynzy message.    Questions for provider review:    None                                                                                                                                    Bryanna Mercer CMA     Chart routed to Care Team .         0 = independent

## 2020-03-11 ENCOUNTER — HEALTH MAINTENANCE LETTER (OUTPATIENT)
Age: 40
End: 2020-03-11

## 2020-04-28 ENCOUNTER — NURSE TRIAGE (OUTPATIENT)
Dept: NURSING | Facility: CLINIC | Age: 40
End: 2020-04-28

## 2020-04-28 NOTE — TELEPHONE ENCOUNTER
"    Additional Information    Negative: Shock suspected (e.g., cold/pale/clammy skin, too weak to stand, low BP, rapid pulse)    Negative: Sounds like a life-threatening emergency to the triager    Negative: Bruises with fever    Negative: Tiny bruises (spots or dots) of unknown cause    Negative: Bruise(s) of forehead or head    Negative: Bruise(s) of face or jaw    Negative: Followed an injury, and triager doesn't know which injury guideline to use first    Negative: Post-operative bruising    Negative: Dizziness or lightheadedness    Negative: [1] Bruise on head/face, chest, or abdomen AND [2]  taking Coumadin (warfarin) or other strong blood thinner, or known bleeding disorder (e.g., thrombocytopenia)    Negative: Unexplained bleeding from another site (e.g., gums, nose, urine) as well    Negative: Patient sounds very sick or weak to the triager    Negative: [1] Not caused by an injury AND [2] 5 or more bruises now    Negative: [1] Raised bruise AND [2] size > 2 inches (5 cm) AND [3] expanding    Negative: [1] SEVERE pain AND [2] not improved 2 hours after pain medicine/ice packs    Negative: Suspicious history for the injury    Negative: Taking Coumadin (warfarin) or other strong blood thinner, or known bleeding disorder (e.g., thrombocytopenia)    Negative: [1] Not caused by an injury AND [2] < 5 unexplained bruises    Negative: [1] After 10 days AND [2] bruise not fading    Negative: [1] After 3 weeks AND [2] bruise still present    Negative: Minor bruising at site of heparin injection  (e.g., Heparin, Lovenox, Innohep)    Negative: Bruising easily is a chronic symptom (recurrent or ongoing AND present > 4 weeks)    Minor bruise    Answer Assessment - Initial Assessment Questions  1. APPEARANCE of BRUISE: \"Describe the bruise.\"       Hit on leg last Wednesday by a softball line drive. Hit right over varicose vein  2. SIZE: \"How large is the bruise?\"       Good sized  3. NUMBER: \"How many bruises are there?\" " "      one  4. LOCATION: \"Where is the bruise located?\"       Inner lower leg  5. ONSET: \"How long ago did the bruise occur?\"       Almost a week  6. CAUSE: \"Tell me how it happened.\"      Line drive pitched softball  7. MEDICAL HISTORY: \"Do you have any medical problems that can cause easy bruising or bleeding?\" (e.g., leukemia, liver disease, recent chemotherapy)      no  8. MEDICATIONS : \"Do you take any medications which thin the blood such as: aspirin, heparin, ibuprofen (NSAIDS), Plavix, or Coumadin?\"      no  9. OTHER SYMPTOMS: \"Do you have any other symptoms?\"  (e.g., weakness, dizziness, pain, fever, nosebleed, blood in urine/stool)      no  10. PREGNANCY: \"Is there any chance you are pregnant?\" \"When was your last menstrual period?\"        no    Protocols used: BRUISES-A-AH      "

## 2020-06-02 ENCOUNTER — RECORDS - HEALTHEAST (OUTPATIENT)
Dept: ADMINISTRATIVE | Facility: OTHER | Age: 40
End: 2020-06-02

## 2021-01-03 ENCOUNTER — HEALTH MAINTENANCE LETTER (OUTPATIENT)
Age: 41
End: 2021-01-03

## 2021-04-25 ENCOUNTER — HEALTH MAINTENANCE LETTER (OUTPATIENT)
Age: 41
End: 2021-04-25

## 2021-05-09 ENCOUNTER — APPOINTMENT (OUTPATIENT)
Dept: GENERAL RADIOLOGY | Facility: CLINIC | Age: 41
End: 2021-05-09
Attending: PHYSICIAN ASSISTANT
Payer: COMMERCIAL

## 2021-05-09 ENCOUNTER — HOSPITAL ENCOUNTER (EMERGENCY)
Facility: CLINIC | Age: 41
Discharge: HOME | End: 2021-05-09
Attending: PHYSICIAN ASSISTANT | Admitting: PHYSICIAN ASSISTANT
Payer: COMMERCIAL

## 2021-05-09 VITALS
OXYGEN SATURATION: 97 % | BODY MASS INDEX: 34.15 KG/M2 | TEMPERATURE: 97.9 F | HEART RATE: 99 BPM | RESPIRATION RATE: 16 BRPM | DIASTOLIC BLOOD PRESSURE: 79 MMHG | SYSTOLIC BLOOD PRESSURE: 138 MMHG | HEIGHT: 64 IN | WEIGHT: 200 LBS

## 2021-05-09 DIAGNOSIS — S99.922A FOOT INJURY, LEFT, INITIAL ENCOUNTER: ICD-10-CM

## 2021-05-09 DIAGNOSIS — S99.912A ANKLE INJURY, LEFT, INITIAL ENCOUNTER: ICD-10-CM

## 2021-05-09 PROCEDURE — 73610 X-RAY EXAM OF ANKLE: CPT | Mod: LT

## 2021-05-09 PROCEDURE — 73630 X-RAY EXAM OF FOOT: CPT | Mod: LT

## 2021-05-09 PROCEDURE — 99213 OFFICE O/P EST LOW 20 MIN: CPT | Performed by: PHYSICIAN ASSISTANT

## 2021-05-09 PROCEDURE — G0463 HOSPITAL OUTPT CLINIC VISIT: HCPCS | Performed by: PHYSICIAN ASSISTANT

## 2021-05-09 RX ORDER — IBUPROFEN 200 MG
600 TABLET ORAL ONCE
Status: DISCONTINUED | OUTPATIENT
Start: 2021-05-09 | End: 2021-05-09 | Stop reason: HOSPADM

## 2021-05-09 ASSESSMENT — ENCOUNTER SYMPTOMS
WOUND: 0
NUMBNESS: 0
WEAKNESS: 0

## 2021-05-09 ASSESSMENT — MIFFLIN-ST. JEOR: SCORE: 1547.19

## 2021-05-09 NOTE — ED PROVIDER NOTES
History     Chief Complaint   Patient presents with     Ankle Pain     hit in the left ankle by a softball     HPI  Sridevi Donaldson is a 41 year old female who sustained a left ankle and foot injury 1 hours ago.   Mechanism of injury: direct blow. Patient was playing softball with her kids when her daughter had a ball when she went to grab it and the ball hit her on the lateral aspect of the left ankle and foot.  Immediate symptoms: immediate pain, immediate swelling, inability to bear weight directly after injury, no deformity was noted by the patient.   Symptoms have been sudden since that time.   Prior history of related problems: no prior problems with this area in the past.  Patient states positive pain in the foot and ankle that slightly radiates up the lower leg.  Positive bruising, swelling, tenderness and pain with ambulation.  Patient states some tingling to the toes.    Patient denies any previous injury and states she has not taken anything for the pain.      Problem list, Medication list, Allergies, and Medical/Social/Surgical histories reviewed in [facility] and updated as appropriate.      Allergies:  Allergies   Allergen Reactions     Morphine Other (See Comments)     Red veins, gone after benadryl     Penicillin G Hives       Problem List:    There are no active problems to display for this patient.       Past Medical History:    Past Medical History:   Diagnosis Date     Fibromyalgia        Past Surgical History:    Past Surgical History:   Procedure Laterality Date     DILATION AND CURETTAGE       foot reconstruction Right      ovarian cyst removal         Family History:    History reviewed. No pertinent family history.    Social History:  Marital Status:   [2]  Social History     Tobacco Use     Smoking status: Never Smoker     Smokeless tobacco: Never Used   Substance Use Topics     Alcohol use: Yes     Comment: occasional     Drug use: Never        Medications:    cyclobenzaprine  "(FLEXERIL) 10 MG tablet  oxyCODONE (ROXICODONE) 5 MG tablet          Review of Systems   Musculoskeletal:        Left ankle and foot pain, swelling, bruising, and tenderness.    Skin: Negative for rash and wound.        Positive bruising to the lateral aspect of the left ankle.   Neurological: Negative for weakness and numbness.        Tingling in toes    All other systems reviewed and are negative.      Physical Exam   BP: 138/79  Pulse: 99  Temp: 97.9  F (36.6  C)  Resp: 16  Height: 162.6 cm (5' 4\")  Weight: 90.7 kg (200 lb)(stated)  SpO2: 97 %      Physical Exam  Vitals signs and nursing note reviewed.   Constitutional:       General: She is not in acute distress.     Appearance: Normal appearance. She is normal weight. She is not ill-appearing or toxic-appearing.   Cardiovascular:      Pulses: Normal pulses.   Musculoskeletal:      Left knee: Normal. No tenderness found.      Left ankle: She exhibits decreased range of motion, swelling and ecchymosis. She exhibits no deformity, no laceration and normal pulse. Tenderness. Lateral malleolus, medial malleolus and head of 5th metatarsal tenderness found. No AITFL, no CF ligament, no posterior TFL and no proximal fibula tenderness found. Achilles tendon normal.      Left foot: Decreased range of motion. Normal capillary refill. Tenderness and bony tenderness present. No swelling, crepitus, deformity or laceration.        Feet:       Comments: Patient neurovascularly intact to left lower extremity.   Skin:     General: Skin is warm.      Capillary Refill: Capillary refill takes less than 2 seconds.      Findings: Bruising (to the lateral aspect of the left ankle ) present. No erythema or rash.   Neurological:      General: No focal deficit present.      Mental Status: She is alert and oriented to person, place, and time.      Sensory: No sensory deficit.      Motor: No weakness.      Gait: Gait abnormal.   Psychiatric:         Mood and Affect: Mood normal.         " Behavior: Behavior normal.         Thought Content: Thought content normal.         Judgment: Judgment normal.         ED Course        Procedures              Critical Care time:  none               Results for orders placed or performed during the hospital encounter of 05/09/21 (from the past 24 hour(s))   XR Ankle Left G/E 3 Views    Narrative    EXAM: XR ANKLE LEFT G/E 3 VIEWS  LOCATION: [facility]  DATE/TIME: 5/9/2021 4:43 PM    INDICATION: Ankle pain and swelling.  COMPARISON: None.      Impression    IMPRESSION: Lateral soft tissue swelling. The bones, joint spaces and alignment appear normal. There is no evidence of fracture or talar dome osteochondral lesion.   Foot XR, G/E 3 views, left    Narrative    EXAM: XR FOOT LEFT G/E 3 VIEWS  LOCATION: [facility]  DATE/TIME: 5/9/2021 4:43 PM    INDICATION: Left foot pain.  COMPARISON: None.      Impression    IMPRESSION: Normal joint spaces and alignment. No fracture.       Medications   ibuprofen (ADVIL/MOTRIN) tablet 600 mg (has no administration in time range)       Assessments & Plan (with Medical Decision Making)     I have reviewed the nursing notes.    I have reviewed the findings, diagnosis, plan and need for follow up with the patient.   41-year-old female presents the urgent care with left ankle injury that occurred when she was hit by a softball on the lateral aspect of the left ankle and foot.  Exam findings above.  X-rays obtained and were negative.  Patient placed in Ace wrap and given crutches to use as needed.  Patient ice, rest, elevate, Tylenol and ibuprofen and to follow-up with primary care doctor in 1 week if symptoms persist or fail to improve.  Patient to return sooner if pain out of proportion, numbness or change in symptoms occur.  No concerns at this time for compartment syndrome.  Patient discharged in stable condition.    New Prescriptions    No medications on file       Final diagnoses:   Ankle injury, left,  initial encounter   Foot injury, left, initial encounter       5/9/2021   [facility] EMERGENCY DEPT     Nancy Arceo PA-C  05/09/21 [id number]

## 2021-05-09 NOTE — ED NOTES
Crutch training and ace wrap applied per ERT - patient ambulated out of department with sig other without difficulty.

## 2021-05-31 VITALS — BODY MASS INDEX: 33.29 KG/M2 | HEIGHT: 64 IN | WEIGHT: 195 LBS

## 2021-05-31 VITALS — WEIGHT: 196 LBS | BODY MASS INDEX: 33.64 KG/M2

## 2021-05-31 VITALS — BODY MASS INDEX: 33.3 KG/M2 | WEIGHT: 194 LBS

## 2021-05-31 VITALS — WEIGHT: 185 LBS | BODY MASS INDEX: 31.76 KG/M2

## 2021-05-31 VITALS — HEIGHT: 65 IN | WEIGHT: 190 LBS | BODY MASS INDEX: 31.65 KG/M2

## 2021-06-01 VITALS — WEIGHT: 193 LBS | HEIGHT: 64 IN | BODY MASS INDEX: 32.95 KG/M2

## 2021-06-01 VITALS — BODY MASS INDEX: 32.1 KG/M2 | WEIGHT: 188 LBS | HEIGHT: 64 IN

## 2021-06-10 NOTE — PROGRESS NOTES
Assessment/Plan:      Visit for Preoperative Exam.   37-year-old female to undergo removal of right ganglion cyst.  It is present on the dorsum of the hand.  It symptomatic for her.  She is currently taking no medications.  She has a history of PE with pregnancy and no known coagulopathy.  No anticoagulation is indicated for this procedure.  Patient approved for surgery with general or local anesthesia.     Subjective:     Scheduled Procedure: right hand cyst  Surgery Date:  5/11/2017  Surgery Location:  [facility]  Surgeon:  Alexis    No current outpatient prescriptions on file.     No current facility-administered medications for this visit.        Allergies   Allergen Reactions     Penicillins        Immunization History   Administered Date(s) Administered     Influenza, inj, historic 09/25/2009     Tdap 10/17/2013       Patient Active Problem List   Diagnosis     Major Depression, Recurrent     Myofascial Pain Syndrome     Varicose Veins     Joint Pain Fingers     Neck Pain     Obesity     Hx pulmonary embolism       No past medical history on file.    Social History     Social History     Marital status:      Spouse name: N/A     Number of children: N/A     Years of education: N/A     Occupational History     Not on file.     Social History Main Topics     Smoking status: Never Smoker     Smokeless tobacco: Not on file     Alcohol use Not on file     Drug use: Not on file     Sexual activity: Not on file     Other Topics Concern     Not on file     Social History Narrative       No past surgical history on file.    History of Present Illness  Recent Health  Fever: no  Chills: no  Fatigue: no  Chest Pain: no  Cough: no  Dyspnea: no  Urinary Frequency: no  Nausea: no  Vomiting: no  Diarrhea: no  Abdominal Pain: no  Easy Bruising: no  Lower Extremity Swelling: no  Poor Exercise Tolerance: no        Pertinent History  Prior Anesthesia: yes  Previous Anesthesia Reaction:  vomitting  Diabetes:  "no  Cardiovascular Disease: no  Pulmonary Disease: no  Renal Disease: no  GI Disease: no  Sleep Apnea: no  Thromboembolic Problems: hx of PE, no identified clotting disorder  Clotting Disorder: no  Bleeding Disorder: no  Transfusion Reaction: no  Impaired Immunity: no  Steroid use in the last 6 months: no  Frequent Aspirin use: no    Family history of mother with hx of PE, father with hx of stroke    Social history of there are no concerns regarding care after surgery    After surgery, the patient plans to recover at home with family.    Review of Systems  Review of Systems   Constitutional: Negative for fatigue.   HENT: Negative for congestion and hearing loss.    Eyes: Negative for visual disturbance.   Respiratory: Negative for shortness of breath and wheezing.    Cardiovascular: Negative for chest pain and palpitations.   Gastrointestinal: Negative for abdominal pain, constipation and diarrhea.   Genitourinary: Negative for dysuria and menstrual problem.   Skin: Negative for rash and wound.   Neurological: Negative for headaches.   Hematological: Does not bruise/bleed easily.   Psychiatric/Behavioral: Negative for behavioral problems and sleep disturbance.             Objective:         Vitals:    05/02/17 1506   BP: 110/80   Pulse: 84   Resp: 16   Temp: 98  F (36.7  C)   Weight: 190 lb (86.2 kg)   Height: 5' 5\" (1.651 m)       Physical Exam:  Physical Exam   Constitutional: She is oriented to person, place, and time. She appears well-developed and well-nourished.   HENT:   Head: Normocephalic.   Right Ear: External ear normal.   Left Ear: External ear normal.   Mouth/Throat: Oropharynx is clear and moist.   Eyes: EOM are normal. Pupils are equal, round, and reactive to light.   Neck: Normal range of motion. Neck supple.   Cardiovascular: Normal rate and regular rhythm.    Pulmonary/Chest: Effort normal and breath sounds normal. She has no wheezes.   Abdominal: Soft. Bowel sounds are normal. There is no " tenderness.   Musculoskeletal: Normal range of motion.   Right  wrist ganglion cyst   Neurological: She is alert and oriented to person, place, and time. Coordination normal.   Skin: Skin is warm and dry. No rash noted.   Psychiatric: Her behavior is normal.

## 2021-06-13 NOTE — PROGRESS NOTES
Assessment/Plan:  1. Shortness of breath  She is dispensing more shortness of breath and is unable to sing as she typically would.  As she does have a history of a PE, will do a d-dimer today.  If this is normal no further evaluation needed.  If it is elevated would order a CT PE run.  Certainly could be more asthma or allergy related if she does have a history of this.  I prescribed her a Ventolin inhaler which she can get started at any time.  - albuterol (PROAIR HFA;PROVENTIL HFA;VENTOLIN HFA) 90 mcg/actuation inhaler; Inhale 2 puffs every 6 (six) hours as needed for wheezing.  Dispense: 1 Inhaler; Refill: 2  - D-dimer, Quantitative    2. Chronic daily headache  New chronic daily headaches since July.  This occurred after an unusual fall.  She does not remember tripping and did not have the typical scrapes along her hand with this fall.  This may need further evaluation.  For now are going to evaluate the headaches with an MRI scan.  Will include evaluation for aneurysm.  Will start her on amitriptyline which will help with both her sleep and her daily headaches.  We will do a series of labs to evaluate for any other cause for her fatigue and memory issues.  - amitriptyline (ELAVIL) 25 MG tablet; Take 1 tablet (25 mg total) by mouth at bedtime.  Dispense: 30 tablet; Refill: 0  - MR Brain COW With Without Contrast; Future  - diazePAM (VALIUM) 5 MG tablet; Take 1 tablet 30 min prior to test, repeat x1 if needed  Dispense: 2 tablet; Refill: 0  - Thyroid Cascade  - HM2(CBC w/o Differential)  - Ferritin  - Comprehensive Metabolic Panel  - Vitamin D, Total (25-Hydroxy)  - Vitamin B12    3. Memory loss    - MR Brain COW With Without Contrast; Future  - Comprehensive Metabolic Panel  - Vitamin D, Total (25-Hydroxy)  - Vitamin B12      Subjective:  37-year-old female presents today with a constellation of symptoms.  Her most prominent complaint is that of daily headaches since July.  Started after she fell.  She states  she is walking long does not remember passing out or tripping but then had lost consciousness and hit her head with the fall.  She was evaluated in the emergency room did have a head CT.  This was normal at the time.  Ever since that time she has had daily headaches.  She states that she is been intolerant to noise, she has had some neck pain and feels like she is forgetting things.  She states the other day she walked in the shower without taking her products.  She states there is multiple things that she is forgetting to do when taking care of her children.  She states she is getting normal steps with cooking.  She also states that she is not sleeping well at night.  When she did sustain a fall, she had a wrist injury and she is following with orthopedics.  She recently had an MRI scan of her wrist.  She is also noticing that she is feeling more short of breath.  This is been going on for about a week.  She was seen in the emergency room they did do a chest x-ray which was normal.  She does have a history of blood clots with her last pregnancy.    Family medical history significant for blood clots and her father had a brain aneurysm    Objective:  /80  Pulse 80  Resp 16  Wt 194 lb (88 kg)  Breastfeeding? No  BMI 33.3 kg/m2  Alert and in no acute distress  Neurologically she does appear to be intact  Cranial nerves II through XII are intact  Heart regular rate and rhythm  Lungs clear to auscultation bilaterally  Right lower extremity shows significant varicose veins but no significant edema  Left leg is normal in appearance without edema and Homans sign is negative

## 2021-06-14 NOTE — PROGRESS NOTES
ASSESSMENT:  1. Headache  She has persistent headaches now for almost 2 months.  The gabapentin has helped with her more significant migraines but she still has a chronic dull daily headache.  She feels like she occasionally does have some vision changes that are nonspecific.  She has been trying to avoid daily ibuprofen or Tylenol.  Some the symptoms may be consistent with pseudotumor cerebri and she is referred to neurology.  We increase her dose of gabapentin.        PLAN:  There are no Patient Instructions on file for this visit.    No orders of the defined types were placed in this encounter.    There are no discontinued medications.    No Follow-up on file.    CHIEF COMPLAINT;  Chief Complaint   Patient presents with     Follow-up     headaches       HISTORY OF PRESENT ILLNESS:  Sridevi is a 37 y.o. female presenting to the clinic today with complaints of headaches. Her headaches have not improved since she was last seen on 11/16/17. She notes her headaches are still present, but the migraines have decreased. It has been two weeks since her last migraine. Her constant headaches are at a 4/10 on a pain scale. She notes no improvement in the level of intensity of her headaches. She denies balance issues or double vision. She does get intermittent blurry vision. Her night vision has gotten worse since her headaches started. She is unable to drive at night. She thinks the headaches get better when she is laying flat, but she wakes up in the morning with symptoms. She has set up an appointment with a neurologist. She is taking 300 mg of gabapentin at night. She would like to increase the dosage. She notes she cannot take gabapentin during the day because it had sedative effects.     REVIEW OF SYSTEMS:  Her neck pain has gotten better since she was put on restrictions to not lift over 10 pounds at work. She started physical therapy last week. All other systems are negative.    PFSH:  Reviewed as below.    TOBACCO  USE:  History   Smoking Status     Never Smoker   Smokeless Tobacco     Not on file       VITALS:  Vitals:    12/04/17 1406   BP: 104/76   Pulse: 64   Resp: 16   Weight: 185 lb (83.9 kg)     Wt Readings from Last 3 Encounters:   12/04/17 185 lb (83.9 kg)   11/16/17 196 lb (88.9 kg)   10/26/17 194 lb (88 kg)     Body mass index is 31.76 kg/(m^2).    PHYSICAL EXAM:  GENERAL APPEARANCE: Alert, cooperative, no distress, appears stated age  HEAD: Normocephalic, without obvious abnormality, atraumatic  NEUROLOGIC: CNII-XII intact.     RECENT RESULTS  No results found for this or any previous visit (from the past 48 hour(s)).    ADDITIONAL HISTORY SUMMARIZED (2): None.  DECISION TO OBTAIN EXTRA INFORMATION (1): None.  RADIOLOGY TESTS (1): None.  LABS (1): None.  MEDICINE TESTS (1): None.  INDEPENDENT REVIEW (2 each): None.    The visit lasted a total of 6 minutes face to face with the patient. Over 50% of the time was spent counseling and educating the patient about headaches.    Evon JAIME, am scribing for and in the presence of, Dr. Tate.    Bebeto JAIME, personally performed the services described in this documentation, as scribed by Evon Mendez in my presence, and it is both accurate and complete.    MEDICATIONS:  Current Outpatient Prescriptions   Medication Sig Dispense Refill     albuterol (PROAIR HFA;PROVENTIL HFA;VENTOLIN HFA) 90 mcg/actuation inhaler Inhale 2 puffs every 6 (six) hours as needed for wheezing. 1 Inhaler 2     gabapentin (NEURONTIN) 100 MG capsule Take 1 tablet po qhs, increase by 1 tablet every 3 days until you reach 300 mg qhs. 90 capsule 2     diazePAM (VALIUM) 5 MG tablet Take 1 tablet 30 min prior to test, repeat x1 if needed 2 tablet 0     oxyCODONE-acetaminophen (PERCOCET) 5-325 mg per tablet Take 1-2 tablets by mouth every 4 (four) hours as needed for pain. 20 tablet 0     No current facility-administered medications for this visit.        Total data points: 0

## 2021-06-14 NOTE — PROGRESS NOTES
ASSESSMENT:  1. Headache  Chronic daily headaches now for several weeks.  The amitriptyline is not helpful.  She stopped taking ibuprofen and Tylenol.  MRI scan was normal.  Will check an LUCIANA, sed rate and CRP.  She states she has a history of having an abnormal LUCIANA.  Will start gabapentin.  Will start 100 mg at night and increase by 100 mg every 3 days until she gets 300 mg.  Will then consider increasing to twice daily if needed.  Discussed side effects of this medication.  She is concerned about possible weight gain so we will watch closely for this.  If at any point she has a sudden increase in her headaches, she will need to be seen immediately for further evaluation.  - Antinuclear Antibody (LUCIANA) Cascade  - Sedimentation Rate  - C-Reactive Protein(CRP)    Her LUCIANA and sed rate are mildly elevated.  Will refer to rheumatology for further evaluation of this.  It is hard to know if this is contributing to her headache symptoms.  It is also interesting that she has an increase in her headaches when she is lying flat.  I would like her to see neurology for further evaluation of her daily headaches.  On the differential would include pseudotumor cerebri.  At this point she is not having any visual changes.    PLAN:  Patient Instructions   Start taking the gabapentin in the evenings and every 3 days increase the dose.       Orders Placed This Encounter   Procedures     Antinuclear Antibody (LUCIANA) Cascade     Sedimentation Rate     C-Reactive Protein(CRP)     Medications Discontinued During This Encounter   Medication Reason     amitriptyline (ELAVIL) 25 MG tablet        No Follow-up on file.    CHIEF COMPLAINT;  Chief Complaint   Patient presents with     Follow-up     HA's for several months       HISTORY OF PRESENT ILLNESS:  Sridevi is a 37 y.o. female presenting to the clinic today with continuous headaches.    Headaches: She notes no improvements in her headaches since she was last seen, 10/26/17. She has a  constant headache that she describes as a band around her head, with occasional shooting pains behind her head or near her sinuses.  The headaches are the worst in the morning. She has not been taking any other medication other than amitriptyline. She feels like she takes too much ibuprofen for her back, knee, and neck pain. Previously to manage the pain, she was taking quite a bit of ibuprofen. However, since starting amitriptyline, she has not needed as much ibuprofen. Since starting the amitriptyline, she has felt more fatigued through the day. She is not sleeping well, and it is very hard to get up in the morning. She states she hates napping but that she will go back to sleep after she gets her kids off to school. In the past, she has been able to sleep off a headache, but is no longer able to do so. Her headaches are present in the morning when she wakes up and when she wakes up from naps. She has taken gabapentin before for knee pain, and it did help, but she stopped taking it because she thought she was gaining weight because of it. She is amenable to starting this medication again, however will stop it if she notices weight gain. She will see Dr. Espinoza at [facility] on Monday, 11/20/17.     Neck Pain: She is unable to move her head at times. She had an MRI of her cervical spine on 11/6/17. She takes ibuprofen for back, knee, and neck pain.    Right Wrist Pain: She is followed by [facility]. She underwent an EMG of her arm on 10/30/17.  She got a cortisone injection for possible carpel tunnel. The injection did not help her wrist pain.    REVIEW OF SYSTEMS:  She is currently taking a vitamin D supplement. She is attempting to lose weight. She wakes up with numbness and pain in her left pinky. She denies vision, speech, and balance issues. All other systems are negative.    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 She had Hand, Foot, and Mouth go around at her workplace.    Medical History: She  previously saw a rheumatologist and thinks she had a bad LUCIANA a couple of years ago, but cannot recall exactly when it was. She has been told that she may have Lupus in the past.    TOBACCO USE:  History   Smoking Status     Never Smoker   Smokeless Tobacco     Not on file       VITALS:  Vitals:    11/16/17 1413   BP: 118/68   Pulse: 72   Resp: 16   Weight: 196 lb (88.9 kg)     Wt Readings from Last 3 Encounters:   11/16/17 196 lb (88.9 kg)   10/26/17 194 lb (88 kg)   09/19/17 195 lb (88.5 kg)     Body mass index is 33.64 kg/(m^2).    PHYSICAL EXAM:  General Appearance: Alert, pleasant, appears stated age  Head: Normocephalic, without obvious abnormality  Neuro: Normal she is neurologically intact.  Her gait is normal.  Her affect is appropriate.  Cranial nerves II through XII are intact also.      ADDITIONAL HISTORY SUMMARIZED (2): Reviewed [facility] Ortho Note from 11/6/17 regarding right wrist pain.  DECISION TO OBTAIN EXTRA INFORMATION (1): None.  RADIOLOGY TESTS (1): Cervical MRI from Dr. Espinoza from 11/6/17 reviewed.  LABS (1): Labs ordered. Labs reviewed from 10/26/17.  MEDICINE TESTS (1): None.  INDEPENDENT REVIEW (2 each): None.      The visit lasted a total of 14 minutes face to face with the patient. Over 50% of the time was spent counseling and educating the patient about headaches.    Evon JAIME, am scribing for and in the presence of, Dr. Tate.    IBebeto, personally performed the services described in this documentation, as scribed by Evon Mendez in my presence, and it is both accurate and complete.    MEDICATIONS:  Current Outpatient Prescriptions   Medication Sig Dispense Refill     albuterol (PROAIR HFA;PROVENTIL HFA;VENTOLIN HFA) 90 mcg/actuation inhaler Inhale 2 puffs every 6 (six) hours as needed for wheezing. 1 Inhaler 2     diazePAM (VALIUM) 5 MG tablet Take 1 tablet 30 min prior to test, repeat x1 if needed 2 tablet 0     gabapentin (NEURONTIN) 100 MG capsule Take 1 tablet po qhs,  increase by 1 tablet every 3 days until you reach 300 mg qhs. 90 capsule 2     oxyCODONE-acetaminophen (PERCOCET) 5-325 mg per tablet Take 1-2 tablets by mouth every 4 (four) hours as needed for pain. 20 tablet 0     No current facility-administered medications for this visit.        Total data points: 5
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
## 2021-06-16 PROBLEM — R51.9 HEADACHE: Status: ACTIVE | Noted: 2017-09-19

## 2021-06-16 PROBLEM — R05.9 COUGH: Status: ACTIVE | Noted: 2017-09-19

## 2021-06-16 PROBLEM — S76.211A INGUINAL STRAIN, RIGHT, INITIAL ENCOUNTER: Status: ACTIVE | Noted: 2017-09-19
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
## 2021-06-17 NOTE — PROGRESS NOTES
Patient presents today for a procedure visit, as still has active pains involving upper back/mid trapezius regions.      Consent to treat form signed by the patient.  Patient's bilateral upper back/mid trapezius trigger point regions were prepped in a sterile manner with an alcohol swab and ChloraPrep applicator. Injected 1-1/2 ml of Lidocaine and one half ml of Marcaine into bilateral upper back/mid trapezius trigger point regions.  Patient tolerated the procedures well with no complications noted.  Patient was advised to place ice over injection sites if sore over the next 24-48 hours. Kamila LYNCH assisted with procedure.    Post injections lungs were clear to auscultation.
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
## 2021-08-24 ENCOUNTER — HOSPITAL ENCOUNTER (EMERGENCY)
Facility: CLINIC | Age: 41
Discharge: ANOTHER HOSPITAL | End: 2021-08-25
Attending: EMERGENCY MEDICINE | Admitting: EMERGENCY MEDICINE
Payer: COMMERCIAL

## 2021-08-24 ENCOUNTER — APPOINTMENT (OUTPATIENT)
Dept: ULTRASOUND IMAGING | Facility: CLINIC | Age: 41
End: 2021-08-24
Attending: EMERGENCY MEDICINE
Payer: COMMERCIAL

## 2021-08-24 ENCOUNTER — OFFICE VISIT (OUTPATIENT)
Dept: URGENT CARE | Facility: URGENT CARE | Age: 41
End: 2021-08-24
Payer: COMMERCIAL

## 2021-08-24 ENCOUNTER — APPOINTMENT (OUTPATIENT)
Dept: CT IMAGING | Facility: CLINIC | Age: 41
End: 2021-08-24
Attending: EMERGENCY MEDICINE
Payer: COMMERCIAL

## 2021-08-24 VITALS
SYSTOLIC BLOOD PRESSURE: 115 MMHG | OXYGEN SATURATION: 98 % | RESPIRATION RATE: 24 BRPM | DIASTOLIC BLOOD PRESSURE: 27 MMHG | TEMPERATURE: 98.6 F | HEART RATE: 124 BPM

## 2021-08-24 DIAGNOSIS — R00.0 SINUS TACHYCARDIA: ICD-10-CM

## 2021-08-24 DIAGNOSIS — R00.0 TACHYCARDIA: ICD-10-CM

## 2021-08-24 DIAGNOSIS — I50.9 ACUTE CONGESTIVE HEART FAILURE, UNSPECIFIED HEART FAILURE TYPE (H): ICD-10-CM

## 2021-08-24 DIAGNOSIS — R06.02 SOB (SHORTNESS OF BREATH): Primary | ICD-10-CM

## 2021-08-24 LAB
ANION GAP SERPL CALCULATED.3IONS-SCNC: 8 MMOL/L (ref 3–14)
BASOPHILS # BLD AUTO: 0 10E3/UL (ref 0–0.2)
BASOPHILS NFR BLD AUTO: 0 %
BUN SERPL-MCNC: 11 MG/DL (ref 7–30)
CALCIUM SERPL-MCNC: 9 MG/DL (ref 8.5–10.1)
CHLORIDE BLD-SCNC: 108 MMOL/L (ref 94–109)
CO2 SERPL-SCNC: 24 MMOL/L (ref 20–32)
CREAT SERPL-MCNC: 0.63 MG/DL (ref 0.52–1.04)
D DIMER PPP FEU-MCNC: 0.57 UG/ML FEU (ref 0–0.5)
EOSINOPHIL # BLD AUTO: 0.1 10E3/UL (ref 0–0.7)
EOSINOPHIL NFR BLD AUTO: 1 %
ERYTHROCYTE [DISTWIDTH] IN BLOOD BY AUTOMATED COUNT: 11.2 % (ref 10–15)
GFR SERPL CREATININE-BSD FRML MDRD: >90 ML/MIN/1.73M2
GLUCOSE BLD-MCNC: 87 MG/DL (ref 70–99)
HCT VFR BLD AUTO: 34.4 % (ref 35–47)
HGB BLD-MCNC: 11.2 G/DL (ref 11.7–15.7)
HOLD SPECIMEN: NORMAL
IMM GRANULOCYTES # BLD: 0 10E3/UL
IMM GRANULOCYTES NFR BLD: 0 %
LYMPHOCYTES # BLD AUTO: 2.6 10E3/UL (ref 0.8–5.3)
LYMPHOCYTES NFR BLD AUTO: 37 %
MCH RBC QN AUTO: 30.5 PG (ref 26.5–33)
MCHC RBC AUTO-ENTMCNC: 32.6 G/DL (ref 31.5–36.5)
MCV RBC AUTO: 94 FL (ref 78–100)
MONOCYTES # BLD AUTO: 1 10E3/UL (ref 0–1.3)
MONOCYTES NFR BLD AUTO: 14 %
NEUTROPHILS # BLD AUTO: 3.5 10E3/UL (ref 1.6–8.3)
NEUTROPHILS NFR BLD AUTO: 48 %
NRBC # BLD AUTO: 0 10E3/UL
NRBC BLD AUTO-RTO: 0 /100
PLATELET # BLD AUTO: 270 10E3/UL (ref 150–450)
POTASSIUM BLD-SCNC: 3.9 MMOL/L (ref 3.4–5.3)
RBC # BLD AUTO: 3.67 10E6/UL (ref 3.8–5.2)
SARS-COV-2 RNA RESP QL NAA+PROBE: NEGATIVE
SODIUM SERPL-SCNC: 140 MMOL/L (ref 133–144)
WBC # BLD AUTO: 7.1 10E3/UL (ref 4–11)

## 2021-08-24 PROCEDURE — 93308 TTE F-UP OR LMTD: CPT | Mod: 26 | Performed by: EMERGENCY MEDICINE

## 2021-08-24 PROCEDURE — 99285 EMERGENCY DEPT VISIT HI MDM: CPT | Mod: 25 | Performed by: EMERGENCY MEDICINE

## 2021-08-24 PROCEDURE — 83880 ASSAY OF NATRIURETIC PEPTIDE: CPT | Performed by: EMERGENCY MEDICINE

## 2021-08-24 PROCEDURE — 93005 ELECTROCARDIOGRAM TRACING: CPT | Performed by: EMERGENCY MEDICINE

## 2021-08-24 PROCEDURE — 93000 ELECTROCARDIOGRAM COMPLETE: CPT | Mod: 77 | Performed by: PHYSICIAN ASSISTANT

## 2021-08-24 PROCEDURE — 258N000003 HC RX IP 258 OP 636: Performed by: EMERGENCY MEDICINE

## 2021-08-24 PROCEDURE — 85025 COMPLETE CBC W/AUTO DIFF WBC: CPT | Performed by: EMERGENCY MEDICINE

## 2021-08-24 PROCEDURE — 84484 ASSAY OF TROPONIN QUANT: CPT | Performed by: EMERGENCY MEDICINE

## 2021-08-24 PROCEDURE — 80048 BASIC METABOLIC PNL TOTAL CA: CPT | Performed by: EMERGENCY MEDICINE

## 2021-08-24 PROCEDURE — 99215 OFFICE O/P EST HI 40 MIN: CPT | Mod: 25 | Performed by: PHYSICIAN ASSISTANT

## 2021-08-24 PROCEDURE — 250N000009 HC RX 250: Performed by: EMERGENCY MEDICINE

## 2021-08-24 PROCEDURE — 85379 FIBRIN DEGRADATION QUANT: CPT | Performed by: EMERGENCY MEDICINE

## 2021-08-24 PROCEDURE — 87635 SARS-COV-2 COVID-19 AMP PRB: CPT | Performed by: EMERGENCY MEDICINE

## 2021-08-24 PROCEDURE — 93308 TTE F-UP OR LMTD: CPT | Performed by: EMERGENCY MEDICINE

## 2021-08-24 PROCEDURE — C9803 HOPD COVID-19 SPEC COLLECT: HCPCS | Performed by: EMERGENCY MEDICINE

## 2021-08-24 PROCEDURE — 36415 COLL VENOUS BLD VENIPUNCTURE: CPT | Performed by: EMERGENCY MEDICINE

## 2021-08-24 PROCEDURE — 71275 CT ANGIOGRAPHY CHEST: CPT

## 2021-08-24 PROCEDURE — 250N000011 HC RX IP 250 OP 636: Performed by: EMERGENCY MEDICINE

## 2021-08-24 PROCEDURE — 93970 EXTREMITY STUDY: CPT

## 2021-08-24 PROCEDURE — 93010 ELECTROCARDIOGRAM REPORT: CPT | Performed by: EMERGENCY MEDICINE

## 2021-08-24 PROCEDURE — 96361 HYDRATE IV INFUSION ADD-ON: CPT | Performed by: EMERGENCY MEDICINE

## 2021-08-24 RX ORDER — IOPAMIDOL 755 MG/ML
83 INJECTION, SOLUTION INTRAVASCULAR ONCE
Status: COMPLETED | OUTPATIENT
Start: 2021-08-24 | End: 2021-08-24

## 2021-08-24 RX ADMIN — SODIUM CHLORIDE, POTASSIUM CHLORIDE, SODIUM LACTATE AND CALCIUM CHLORIDE 1000 ML: 600; 310; 30; 20 INJECTION, SOLUTION INTRAVENOUS at 22:45

## 2021-08-24 RX ADMIN — SODIUM CHLORIDE 100 ML: 9 INJECTION, SOLUTION INTRAVENOUS at 22:58

## 2021-08-24 RX ADMIN — IOPAMIDOL 83 ML: 755 INJECTION, SOLUTION INTRAVENOUS at 22:58

## 2021-08-24 ASSESSMENT — ENCOUNTER SYMPTOMS
DIARRHEA: 0
WEAKNESS: 0
FATIGUE: 0
FEVER: 0
SORE THROAT: 0
NAUSEA: 0
BACK PAIN: 0
SHORTNESS OF BREATH: 1
VOMITING: 0
NUMBNESS: 0
RHINORRHEA: 0
APPETITE CHANGE: 0
HEADACHES: 0
DIAPHORESIS: 0
LIGHT-HEADEDNESS: 1
CHILLS: 0
CHEST TIGHTNESS: 1
DYSURIA: 0
CONFUSION: 0
PALPITATIONS: 1
ABDOMINAL PAIN: 0
NERVOUS/ANXIOUS: 0

## 2021-08-24 NOTE — PROGRESS NOTES
"    Assessment & Plan     SOB (shortness of breath)  Patient has sinus tachycardia on EKG, is short of breath with conversation, and has a history of PE. Would recommend she be seen in the ED for further evaluation. She agrees with plan and will go by private car. Discussed case with the ED and they are expecting her arrival.     - EKG 12-lead complete w/read - Clinics    Sinus tachycardia      Tachycardia    - EKG 12-lead complete w/read - Clinics             BMI:   Estimated body mass index is 34.33 kg/m  as calculated from the following:    Height as of 21: 1.626 m (5' 4\").    Weight as of an earlier encounter on 21: 90.7 kg (200 lb).           No follow-ups on file.    Berta Esparza PA-C  [facility] [place]    Subjective   Chief Complaint   Patient presents with     Dizziness     8/15/21 Patient is here with dizziness, shakey legs, legs and feet swelling- yesterday drove back from [place], heads throbing when she gets up heart feels like it's racing.       HPI     Cardiac Event    Symptom Onset: 1 week(s) ago.  Timing of illness: gradually worsening this past week  Current and Associated symptoms: shaky, dizziness, shortness of breath   Character: chest pressure, .  Severity moderate.  Location: center chest.  Radiation: none.  Associated Symptoms: SOB and nausea.  Exacerbated by: exertion.  Relieved by: nothing .  Cardiac risk factors: no personal cardiac history   Family history dad  from acute MI at age 77  Patient reports history of PE in the past      Brother's  was 3 days ago,  last September due to infection.               Review of Systems   Constitutional, HEENT, cardiovascular, pulmonary, gi and gu systems are negative, except as otherwise noted.      Objective    BP (!) 115/27   Pulse (!) 124   Temp 98.6  F (37  C) (Tympanic)   Resp 24   SpO2 98%   There is no height or weight on file to calculate BMI.  Physical Exam  Constitutional:       " Appearance: She is well-developed.      Comments: No acute distress but is shortness of breath with conversation    HENT:      Head: Normocephalic.      Right Ear: Tympanic membrane and ear canal normal.      Left Ear: Tympanic membrane and ear canal normal.   Eyes:      Conjunctiva/sclera: Conjunctivae normal.   Cardiovascular:      Rate and Rhythm: Tachycardia present.      Heart sounds: Normal heart sounds.   Pulmonary:      Effort: Pulmonary effort is normal.      Breath sounds: Normal breath sounds.   Skin:     General: Skin is warm and dry.      Findings: No rash.   Psychiatric:         Behavior: Behavior normal.            EKG - Reviewed and interpreted by me sinus tachycardia, no acute ST/T changes

## 2021-08-25 ENCOUNTER — TRANSFERRED RECORDS (OUTPATIENT)
Dept: HEALTH INFORMATION MANAGEMENT | Facility: CLINIC | Age: 41
End: 2021-08-25

## 2021-08-25 VITALS
WEIGHT: 200 LBS | HEART RATE: 115 BPM | TEMPERATURE: 98.5 F | DIASTOLIC BLOOD PRESSURE: 49 MMHG | SYSTOLIC BLOOD PRESSURE: 112 MMHG | OXYGEN SATURATION: 98 % | RESPIRATION RATE: 17 BRPM | BODY MASS INDEX: 34.33 KG/M2

## 2021-08-25 LAB
ALBUMIN UR-MCNC: NEGATIVE MG/DL
APPEARANCE UR: CLEAR
BILIRUB UR QL STRIP: NEGATIVE
COLOR UR AUTO: ABNORMAL
EJECTION FRACTION: NORMAL %
GLUCOSE UR STRIP-MCNC: NEGATIVE MG/DL
HGB UR QL STRIP: NEGATIVE
KETONES UR STRIP-MCNC: 5 MG/DL
LEUKOCYTE ESTERASE UR QL STRIP: NEGATIVE
NITRATE UR QL: NEGATIVE
NT-PROBNP SERPL-MCNC: 844 PG/ML (ref 0–450)
PH UR STRIP: 6 [PH] (ref 5–7)
RBC URINE: 1 /HPF
SP GR UR STRIP: 1.02 (ref 1–1.03)
SQUAMOUS EPITHELIAL: <1 /HPF
TROPONIN I SERPL-MCNC: <0.015 UG/L (ref 0–0.04)
UROBILINOGEN UR STRIP-MCNC: NORMAL MG/DL
WBC URINE: <1 /HPF

## 2021-08-25 PROCEDURE — 96374 THER/PROPH/DIAG INJ IV PUSH: CPT | Mod: 59 | Performed by: EMERGENCY MEDICINE

## 2021-08-25 PROCEDURE — 81001 URINALYSIS AUTO W/SCOPE: CPT | Performed by: EMERGENCY MEDICINE

## 2021-08-25 PROCEDURE — 250N000011 HC RX IP 250 OP 636: Performed by: EMERGENCY MEDICINE

## 2021-08-25 RX ORDER — FUROSEMIDE 10 MG/ML
60 INJECTION INTRAMUSCULAR; INTRAVENOUS ONCE
Status: COMPLETED | OUTPATIENT
Start: 2021-08-25 | End: 2021-08-25

## 2021-08-25 RX ADMIN — FUROSEMIDE 60 MG: 10 INJECTION, SOLUTION INTRAMUSCULAR; INTRAVENOUS at 01:57

## 2021-08-25 NOTE — ED NOTES
"Start last Sunday  Shaking, racing heart - fitbid said \"high reading\" but unknown how high heart rate was.   Returned from [place] yesterday, noted swelling of feet and legs. Painful to walk, legs hurting since yesterday.   "

## 2021-08-25 NOTE — ED TRIAGE NOTES
Dizziness/light-headed, shortness of breath, chest pain, headaches, nausea  One week duration  Nonsmoker  No active meds  No recent travel  Not vaccinated for covid

## 2021-08-25 NOTE — ED PROVIDER NOTES
History     Chief Complaint   Patient presents with     Shortness of Breath     HPI  Sridevi Donaldson is a 41 year old female w with history of previous PE during pregnancy presenting for evaluation of roughly 9 days of palpitations with lightheadedness.  Symptoms worsened relatively suddenly about a week ago.  Has also been noticing more increased exertional dyspnea and exertional fatigue.  Denies fever, chills, cough, rhinorrhea, sore throat, or other respiratory symptoms.  After road trip 3 days ago, she developed lower extremity pain and swelling which has continued as well.  Pain is more prominent in the left leg but also in the right.  Pain extends entirely through her leg without focal pains.  Continues to have swelling in the leg which is atypical.  Denies abdominal pain, nausea, vomiting.  Still eating and drinking relatively normally.  Urinating regularly.  Not currently on anticoagulation.    Allergies:  Allergies   Allergen Reactions     Morphine Other (See Comments)     Red veins, gone after benadryl     Penicillin G Hives       Problem List:    Patient Active Problem List    Diagnosis Date Noted     Groin strain, right, initial encounter 09/19/2017     Priority: Medium     IMO Regulatory Load OCT 2020         Cough 09/19/2017     Priority: Medium     Headache 09/19/2017     Priority: Medium     Major Depression, Recurrent      Priority: Medium     Created by Conversion  Replacement Utility updated for latest IMO load         Hx pulmonary embolism 11/19/2015     Priority: Medium     Obesity      Priority: Medium     Created by Conversion         Myofascial Pain Syndrome      Priority: Medium     Created by Conversion         Varicose Veins      Priority: Medium     Created by Conversion         Joint Pain Fingers      Priority: Medium     Created by Conversion         Neck Pain      Priority: Medium     Created by Conversion            Past Medical History:    Past Medical History:   Diagnosis Date      Fibromyalgia        Past Surgical History:    Past Surgical History:   Procedure Laterality Date     DILATION AND CURETTAGE       foot reconstruction Right      ovarian cyst removal         Family History:    No family history on file.    Social History:  Marital Status:   [2]  Social History     Tobacco Use     Smoking status: Never Smoker     Smokeless tobacco: Never Used   Substance Use Topics     Alcohol use: Yes     Comment: occasional     Drug use: Never        Medications:    cyclobenzaprine (FLEXERIL) 10 MG tablet  oxyCODONE (ROXICODONE) 5 MG tablet          Review of Systems   Constitutional: Negative for appetite change, chills, diaphoresis, fatigue and fever.   HENT: Negative for congestion, rhinorrhea and sore throat.    Respiratory: Positive for chest tightness and shortness of breath.    Cardiovascular: Positive for chest pain, palpitations and leg swelling.   Gastrointestinal: Negative for abdominal pain, diarrhea, nausea and vomiting.   Genitourinary: Negative for decreased urine volume and dysuria.   Musculoskeletal: Negative for back pain.   Skin: Negative for rash.   Neurological: Positive for light-headedness. Negative for weakness, numbness and headaches.   Psychiatric/Behavioral: Negative for confusion. The patient is not nervous/anxious.    All other systems reviewed and are negative.      Physical Exam   BP: 127/51  Pulse: (!) 134  Temp: 98.5  F (36.9  C)  Resp: 24  Weight: 90.7 kg (200 lb)  SpO2: 100 %      Physical Exam  Vitals and nursing note reviewed.   Constitutional:       Appearance: She is well-developed. She is obese. She is not ill-appearing or diaphoretic.      Comments: Sitting on the edge of the bed, alert and oriented in no distress.  With minimal movement, she becomes visibly winded   HENT:      Head: Atraumatic.      Mouth/Throat:      Mouth: Mucous membranes are moist.   Eyes:      Conjunctiva/sclera: Conjunctivae normal.   Cardiovascular:      Rate and Rhythm:  Regular rhythm. Tachycardia present.      Pulses: Normal pulses.   Pulmonary:      Effort: Pulmonary effort is normal.      Breath sounds: Normal breath sounds.   Abdominal:      Palpations: Abdomen is soft.      Tenderness: There is no abdominal tenderness. There is no guarding.   Musculoskeletal:         General: Normal range of motion.      Cervical back: Normal range of motion.   Skin:     General: Skin is warm and dry.      Capillary Refill: Capillary refill takes less than 2 seconds.   Neurological:      Mental Status: She is alert and oriented to person, place, and time.   Psychiatric:         Mood and Affect: Mood normal.         ED Course        Procedures    Results for orders placed during the hospital encounter of 08/24/21    POC US ECHO LIMITED    [facility] Procedure Note    Limited Bedside ED Cardiac Ultrasound:    PROCEDURE: PERFORMED BY: Dr. Fabian Fierro MD  INDICATIONS/SYMPTOM:  Shortness of Breath  PROBE: Cardiac phased array probe  BODY LOCATION: Chest  FINDINGS:  The ultrasound was performed utilizing the parasternal long axis and parasternal short axis views.  Cardiac contractility:  Present  Gross estimation of cardiac kinesis: normal  Pericardial Effusion:  None  RV:LV ratio: LV > RV    INTERPRETATION:    Chamber size and motion were grossly normal with LV > RV, normal cardiac kinesis.  No pericardial effusion was found.  IMAGE DOCUMENTATION: Images were archived to PACs system.            EKG Interpretation:      Interpreted by Fabian Fierro MD  Time reviewed: 2105 (initially reviewed by other provider at 2046)  Symptoms at time of EKG: palpitations   Rhythm: sinus tachycardia  Rate: 127  Axis: Normal  Ectopy: none  Conduction: normal  ST Segments/ T Waves: Inverted T waves lead III and V3 with flattening in aVF and V4  Q Waves: none  Comparison to prior: Not significant change compared to EKG earlier today    Clinical Impression: Sinus tachycardia  with nonspecific T wave abnormalities                 Results for orders placed or performed during the hospital encounter of 08/24/21 (from the past 24 hour(s))   D dimer quantitative   Result Value Ref Range    D-Dimer Quantitative 0.57 (H) 0.00 - 0.50 ug/mL FEU    Narrative    This D-dimer assay is intended for use in conjunction with a clinical pretest probability assessment model to exclude pulmonary embolism (PE) and deep venous thrombosis (DVT) in outpatients suspected of PE or DVT. The cut-off value is 0.50 ug/mL FEU.   CBC with platelets, differential    Narrative    The following orders were created for panel order CBC with platelets, differential.  Procedure                               Abnormality         Status                     ---------                               -----------         ------                     CBC with platelets and d...[[id number]]  Abnormal            Final result                 Please view results for these tests on the individual orders.   Basic metabolic panel   Result Value Ref Range    Sodium 140 133 - 144 mmol/L    Potassium 3.9 3.4 - 5.3 mmol/L    Chloride 108 94 - 109 mmol/L    Carbon Dioxide (CO2) 24 20 - 32 mmol/L    Anion Gap 8 3 - 14 mmol/L    Urea Nitrogen 11 7 - 30 mg/dL    Creatinine 0.63 0.52 - 1.04 mg/dL    Calcium 9.0 8.5 - 10.1 mg/dL    Glucose 87 70 - 99 mg/dL    GFR Estimate >90 >60 mL/min/1.73m2   Tina Draw    Narrative    The following orders were created for panel order Tina Draw.  Procedure                               Abnormality         Status                     ---------                               -----------         ------                     Extra Red Top Tube[[id number]]                               Final result                 Please view results for these tests on the individual orders.   CBC with platelets and differential   Result Value Ref Range    WBC Count 7.1 4.0 - 11.0 10e3/uL    RBC Count 3.67 (L) 3.80 - 5.20 10e6/uL     Hemoglobin 11.2 (L) 11.7 - 15.7 g/dL    Hematocrit 34.4 (L) 35.0 - 47.0 %    MCV 94 78 - 100 fL    MCH 30.5 26.5 - 33.0 pg    MCHC 32.6 31.5 - 36.5 g/dL    RDW 11.2 10.0 - 15.0 %    Platelet Count 270 150 - 450 10e3/uL    % Neutrophils 48 %    % Lymphocytes 37 %    % Monocytes 14 %    % Eosinophils 1 %    % Basophils 0 %    % Immature Granulocytes 0 %    NRBCs per 100 WBC 0 <1 /100    Absolute Neutrophils 3.5 1.6 - 8.3 10e3/uL    Absolute Lymphocytes 2.6 0.8 - 5.3 10e3/uL    Absolute Monocytes 1.0 0.0 - 1.3 10e3/uL    Absolute Eosinophils 0.1 0.0 - 0.7 10e3/uL    Absolute Basophils 0.0 0.0 - 0.2 10e3/uL    Absolute Immature Granulocytes 0.0 <=0.0 10e3/uL    Absolute NRBCs 0.0 10e3/uL   Extra Red Top Tube   Result Value Ref Range    Hold Specimen JIC    NT pro BNP   Result Value Ref Range    N terminal Pro BNP Inpatient 844 (H) 0 - 450 pg/mL   Troponin I   Result Value Ref Range    Troponin I <0.015 0.000 - 0.045 ug/L   Symptomatic COVID-19 Virus (Coronavirus) by PCR Nasopharyngeal    Specimen: Nasopharyngeal; Swab   Result Value Ref Range    SARS CoV2 PCR Negative Negative    Narrative    Testing was performed using the [employer]  SARS-CoV-2 & Influenza A/B Assay on the [employer]  [employer]am[employer]  System.  This test should be ordered for the detection of SARS-COV-2 in individuals who meet SARS-CoV-2 clinical and/or epidemiological criteria. Test performance is unknown in asymptomatic patients.  This test is for in vitro diagnostic use under the FDA EUA for laboratories certified under CLIA to perform moderate and/or high complexity testing. This test has not been FDA cleared or approved.  A negative test does not rule out the presence of PCR inhibitors in the specimen or target RNA in concentration below the limit of detection for the assay. The possibility of a false negative should be considered if the patient's recent exposure or clinical presentation suggests COVID-19.  [facility] are certified under the  Clinical Laboratory Improvement Amendments of 1988 (CLIA-88) as qualified to perform moderate and/or high complexity laboratory testing.   US Lower Extremity Venous Duplex Bilateral    Narrative    EXAM: US LOWER EXTREMITY VENOUS DUPLEX BILATERAL  LOCATION: [facility]  DATE/TIME: 8/24/2021 10:15 PM    INDICATION: Bilateral lower extremity swelling and pain.  COMPARISON: None.  TECHNIQUE: Venous Duplex ultrasound of bilateral lower extremities with and without compression, augmentation and duplex. Color flow and spectral Doppler with waveform analysis performed.    FINDINGS: Exam includes the common femoral, femoral, popliteal veins as well as segmentally visualized deep calf veins and greater saphenous vein.     RIGHT: No deep vein thrombosis. No superficial thrombophlebitis. No popliteal cyst.    LEFT: No deep vein thrombosis. No superficial thrombophlebitis. No popliteal cyst.      Impression    IMPRESSION:  1.  No deep venous thrombosis in the bilateral lower extremities.   CT Chest Pulmonary Embolism w Contrast    Narrative    EXAM: CT CHEST PULMONARY EMBOLISM W CONTRAST  LOCATION: [facility]  DATE/TIME: 8/24/2021 10:51 PM    INDICATION: Shortness of breath, tachycardia.    COMPARISON: None.    TECHNIQUE: CT chest pulmonary angiogram during arterial phase injection of IV contrast. Multiplanar reformats and MIP reconstructions were performed. Dose reduction techniques were used.     CONTRAST: 83 mL Isovue 370.    FINDINGS:  ANGIOGRAM CHEST: Pulmonary arteries are normal caliber and negative for pulmonary emboli. Thoracic aorta is negative for dissection. No CT evidence of right heart strain.    LUNGS AND PLEURA: No acute infiltrates or consolidation. There is some mild smooth interlobular septal thickening in the lungs which is suggestive of fluid overload. There are a few scattered technically indeterminate pulmonary nodules including, for   example, a 4 mm  nodule in the right lower lobe on series 7 image 144, a 3 mm nodule in the right middle lobe on image 163, and a 4 mm subpleural nodule in the left lower lobe on image 187. Small bilateral pleural effusions.    MEDIASTINUM/AXILLAE: Normal.    CORONARY ARTERY CALCIFICATION: None.    UPPER ABDOMEN: 1.4 cm splenic artery aneurysm left upper quadrant.    MUSCULOSKELETAL: Normal.      Impression    IMPRESSION:  1.  Negative for pulmonary embolism.    2.  Mild smooth interlobular septal thickening in the lungs along with small bilateral pleural effusions. Findings are suggestive of fluid overload. Clinical correlation.    3.  No evidence for pneumonitis.    4.  There are a few scattered technically indeterminate pulmonary nodules measuring up to 4 mm in size. If there are risk factors present such as history of smoking, a follow-up CT in one year would be recommended. If there are no risk factors present,   no specific follow-up is needed.    5.  1.4 cm splenic artery aneurysm left upper quadrant.     POC US ECHO LIMITED    Impression    [facility] Procedure Note      Limited Bedside ED Cardiac Ultrasound:    PROCEDURE: PERFORMED BY: Dr. Fabian Fierro MD  INDICATIONS/SYMPTOM:  Shortness of Breath  PROBE: Cardiac phased array probe  BODY LOCATION: Chest  FINDINGS:   The ultrasound was performed utilizing the parasternal long axis and parasternal short axis views.  Cardiac contractility:  Present  Gross estimation of cardiac kinesis: normal  Pericardial Effusion:  None  RV:LV ratio: LV > RV    INTERPRETATION:    Chamber size and motion were grossly normal with LV > RV, normal cardiac kinesis.    No pericardial effusion was found.   IMAGE DOCUMENTATION: Images were archived to PACs system.          UA with Microscopic reflex to Culture    Specimen: Urine, Midstream   Result Value Ref Range    Color Urine Straw Colorless, Straw, Light Yellow, Yellow    Appearance Urine Clear Clear    Glucose Urine Negative  Negative mg/dL    Bilirubin Urine Negative Negative    Ketones Urine 5  (A) Negative mg/dL    Specific Gravity Urine 1.023 1.003 - 1.035    Blood Urine Negative Negative    pH Urine 6.0 5.0 - 7.0    Protein Albumin Urine Negative Negative mg/dL    Urobilinogen Urine Normal Normal, 2.0 mg/dL    Nitrite Urine Negative Negative    Leukocyte Esterase Urine Negative Negative    RBC Urine 1 <=2 /HPF    WBC Urine <1 <=5 /HPF    Squamous Epithelials Urine <1 <=1 /HPF    Narrative    Urine Culture not indicated       Medications   lactated ringers BOLUS 1,000 mL (0 mLs Intravenous Stopped 8/25/21 0154)   iopamidol (ISOVUE-370) solution 83 mL (83 mLs Intravenous Given 8/24/21 2258)   sodium chloride 0.9 % bag 500mL for CT scan flush use (100 mLs Intravenous Given 8/24/21 2258)   furosemide (LASIX) injection 60 mg (60 mg Intravenous Given 8/25/21 0157)     Patient Vitals for the past 24 hrs:   BP Temp Temp src Pulse Resp SpO2 Weight   08/25/21 0324 112/49 -- -- 115 -- -- --   08/25/21 0316 -- -- -- -- -- 98 % --   08/25/21 0300 -- -- -- -- -- 97 % --   08/25/21 0245 -- -- -- -- -- 97 % --   08/25/21 0230 -- -- -- -- -- 98 % --   08/25/21 0200 122/47 -- -- (!) 124 -- 97 % --   08/25/21 0115 113/52 -- -- -- -- 99 % --   08/24/21 2200 -- -- -- 116 -- 97 % --   08/24/21 2130 -- -- -- (!) 121 17 96 % --   08/24/21 2100 128/61 -- -- (!) 137 15 98 % --   08/24/21 2030 118/59 -- -- (!) 131 20 98 % --   08/24/21 2000 117/63 -- -- (!) 123 17 100 % --   08/24/21 1940 127/51 98.5  F (36.9  C) Tympanic (!) 134 24 100 % 90.7 kg (200 lb)         1:00 AM Patient re-assessed: Patient resting but appears mildly winded. Patient reports no significant change in dyspnea but is slightly more tachycardic than previously. Bedside ultrasound performed is grossly normal.    1:45 AM: Troponin negative. BNP elevated. Given findings suspicious for CHF, will order Lasix to start diuresis. Awaiting callback from cardiology to discuss further.    2:11 AM:   Nubia, cardiology [place]. Reviewed case and findings. Possible valvular abnormality. Recommends further acute workup. Agrees with transfer for further evaluation.     2:28 AM: Family updated.      Assessments & Plan (with Medical Decision Making)  40-year-old female with history of pulmonary embolus during pregnancy but no other significant contributing past medical history presenting for evaluation of several weeks of intermittent palpitations and lightheadedness.  Over the past week or so symptoms have noticeably worsened with more exertional dyspnea and exertional fatigue.  No upper respiratory symptoms.  Has also developed more lower extremity pain and swelling over the past few days after a road trip.  Patient has now pain in both legs more prominent in the left than the right with associated swelling.  Patient arrives uncomfortable and tachycardic with abnormal rapid breathing but normal oxygenation.  Symptoms concerning for possible PE or DVT.  Initial work-up aimed at this diagnosis including CTA and lower extremity duplex.  Duplex negative and CT showed no evidence of PE but did show some evidence of fluid overload.  EKG had some nonspecific T wave changes and troponin was negative but BNP slightly elevated as was her D-dimer.  No signs of renal dysfunction on UA or creatinine bedside ultrasound showed relatively hyperdynamic heart without pericardial effusion.  Discussed with cardiology who recommended further evaluation for possible valvular abnormality leading to heart failure.  No beds available within the [facility] system so was transferred to [facility] for further evaluation of her acute dyspnea, tachycardia, and evidence of heart failure     I have reviewed the nursing notes.    I have reviewed the findings, diagnosis, plan and need for follow up with the patient.       Discharge Medication List as of 8/25/2021  3:31 AM          Final diagnoses:   Acute congestive heart failure, unspecified heart failure  type (H)   Sinus tachycardia       8/24/2021   [facility] EMERGENCY DEPT     Fierro, Fabian Wilkerson MD  08/25/21 8586

## 2021-09-14 ENCOUNTER — OFFICE VISIT (OUTPATIENT)
Dept: FAMILY MEDICINE | Facility: CLINIC | Age: 41
End: 2021-09-14
Payer: COMMERCIAL

## 2021-09-14 VITALS
RESPIRATION RATE: 16 BRPM | OXYGEN SATURATION: 98 % | BODY MASS INDEX: 31.41 KG/M2 | TEMPERATURE: 97.9 F | DIASTOLIC BLOOD PRESSURE: 62 MMHG | HEART RATE: 92 BPM | HEIGHT: 64 IN | SYSTOLIC BLOOD PRESSURE: 110 MMHG | WEIGHT: 184 LBS

## 2021-09-14 DIAGNOSIS — E89.0 POSTOPERATIVE HYPOTHYROIDISM: ICD-10-CM

## 2021-09-14 DIAGNOSIS — R74.8 ELEVATED LIVER ENZYMES: ICD-10-CM

## 2021-09-14 DIAGNOSIS — E05.00 GRAVES DISEASE: ICD-10-CM

## 2021-09-14 DIAGNOSIS — Z09 HOSPITAL DISCHARGE FOLLOW-UP: Primary | ICD-10-CM

## 2021-09-14 LAB
ALBUMIN SERPL-MCNC: 3.4 G/DL (ref 3.4–5)
ALP SERPL-CCNC: 116 U/L (ref 40–150)
ALT SERPL W P-5'-P-CCNC: 96 U/L (ref 0–50)
ANION GAP SERPL CALCULATED.3IONS-SCNC: 6 MMOL/L (ref 3–14)
AST SERPL W P-5'-P-CCNC: 45 U/L (ref 0–45)
BILIRUB SERPL-MCNC: 0.2 MG/DL (ref 0.2–1.3)
BUN SERPL-MCNC: 7 MG/DL (ref 7–30)
CALCIUM SERPL-MCNC: 8.6 MG/DL (ref 8.5–10.1)
CHLORIDE BLD-SCNC: 108 MMOL/L (ref 94–109)
CO2 SERPL-SCNC: 25 MMOL/L (ref 20–32)
CREAT SERPL-MCNC: 0.74 MG/DL (ref 0.52–1.04)
GFR SERPL CREATININE-BSD FRML MDRD: >90 ML/MIN/1.73M2
GLUCOSE BLD-MCNC: 99 MG/DL (ref 70–99)
POTASSIUM BLD-SCNC: 4.2 MMOL/L (ref 3.4–5.3)
PROT SERPL-MCNC: 7.3 G/DL (ref 6.8–8.8)
SODIUM SERPL-SCNC: 139 MMOL/L (ref 133–144)

## 2021-09-14 PROCEDURE — 80053 COMPREHEN METABOLIC PANEL: CPT | Performed by: NURSE PRACTITIONER

## 2021-09-14 PROCEDURE — 99214 OFFICE O/P EST MOD 30 MIN: CPT | Performed by: NURSE PRACTITIONER

## 2021-09-14 PROCEDURE — 36415 COLL VENOUS BLD VENIPUNCTURE: CPT | Performed by: NURSE PRACTITIONER

## 2021-09-14 RX ORDER — PANTOPRAZOLE SODIUM 40 MG/1
TABLET, DELAYED RELEASE ORAL
COMMUNITY
Start: 2021-09-08 | End: 2022-07-19

## 2021-09-14 RX ORDER — LEVOTHYROXINE SODIUM 137 UG/1
137 TABLET ORAL DAILY
COMMUNITY
Start: 2021-09-03 | End: 2022-07-19

## 2021-09-14 ASSESSMENT — PATIENT HEALTH QUESTIONNAIRE - PHQ9
SUM OF ALL RESPONSES TO PHQ QUESTIONS 1-9: 0
5. POOR APPETITE OR OVEREATING: NOT AT ALL

## 2021-09-14 ASSESSMENT — ANXIETY QUESTIONNAIRES
2. NOT BEING ABLE TO STOP OR CONTROL WORRYING: NOT AT ALL
GAD7 TOTAL SCORE: 0
6. BECOMING EASILY ANNOYED OR IRRITABLE: NOT AT ALL
1. FEELING NERVOUS, ANXIOUS, OR ON EDGE: NOT AT ALL
3. WORRYING TOO MUCH ABOUT DIFFERENT THINGS: NOT AT ALL
5. BEING SO RESTLESS THAT IT IS HARD TO SIT STILL: NOT AT ALL
7. FEELING AFRAID AS IF SOMETHING AWFUL MIGHT HAPPEN: NOT AT ALL

## 2021-09-14 ASSESSMENT — MIFFLIN-ST. JEOR: SCORE: 1474.62

## 2021-09-14 NOTE — PROGRESS NOTES
"  Assessment & Plan     Hospital discharge follow-up  Slow improvement since hospitalization.  Has follow up planned with both endocrinology 9/28 and gastroenterology. Follow up if symptoms do not improve or worsen.    Elevated liver enzymes  Trending down which is great.   - Comprehensive metabolic panel (BMP + Alb, Alk Phos, ALT, AST, Total. Bili, TP); Future  - Comprehensive metabolic panel (BMP + Alb, Alk Phos, ALT, AST, Total. Bili, TP)    Graves disease  Following with endocrinology     Postoperative hypothyroidism  Per above.       No follow-ups on file.    ERIN Ochoa [facility]    Subjective   BARRIE is a 41 year old who presents for the following health issues     HPI       Hospital Follow-up Visit:    Hospital/Nursing Home/IP Rehab Facility: [facility]   Date of Admission: 8/25/2021  Date of Discharge: 9/8/2021  Reason(s) for Admission:   1. Grave's disease s/p thyroidectomy   2. Transaminitis         Was your hospitalization related to COVID-19? No   Problems taking medications regularly:  None  Medication changes since discharge: None  Problems adhering to non-medication therapy:  None    Summary of hospitalization:  CareEverywhere information obtained and reviewed  Diagnostic Tests/Treatments reviewed.  Follow up needed: none  Other Healthcare Providers Involved in Patient s Care:         Specialist appointment - endo, gastro planned  Update since discharge: improved.   Post Discharge Medication Reconciliation: discharge medications reconciled, continue medications without change.  Plan of care communicated with patient and family          Review of Systems   Constitutional, HEENT, cardiovascular, pulmonary, gi and gu systems are negative, except as otherwise noted.      Objective    /62 (Cuff Size: Adult Large)   Pulse 92   Temp 97.9  F (36.6  C) (Tympanic)   Resp 16   Ht 1.626 m (5' 4\")   Wt 83.5 kg (184 lb)   SpO2 98%   BMI 31.58 kg/m  "   Body mass index is 31.58 kg/m .  Physical Exam   GENERAL: healthy, alert and no distress  NECK: no adenopathy  RESP: lungs clear to auscultation - no rales, rhonchi or wheezes  CV: regular rate and rhythm, normal S1 S2, no S3 or S4, no murmur, click or rub, no peripheral edema and peripheral pulses strong  PSYCH: mentation appears normal, affect normal/bright    Results for orders placed or performed in visit on 09/14/21   Comprehensive metabolic panel (BMP + Alb, Alk Phos, ALT, AST, Total. Bili, TP)     Status: Abnormal   Result Value Ref Range    Sodium 139 133 - 144 mmol/L    Potassium 4.2 3.4 - 5.3 mmol/L    Chloride 108 94 - 109 mmol/L    Carbon Dioxide (CO2) 25 20 - 32 mmol/L    Anion Gap 6 3 - 14 mmol/L    Urea Nitrogen 7 7 - 30 mg/dL    Creatinine 0.74 0.52 - 1.04 mg/dL    Calcium 8.6 8.5 - 10.1 mg/dL    Glucose 99 70 - 99 mg/dL    Alkaline Phosphatase 116 40 - 150 U/L    AST 45 0 - 45 U/L    ALT 96 (H) 0 - 50 U/L    Protein Total 7.3 6.8 - 8.8 g/dL    Albumin 3.4 3.4 - 5.0 g/dL    Bilirubin Total 0.2 0.2 - 1.3 mg/dL    GFR Estimate >90 >60 mL/min/1.73m2

## 2021-09-14 NOTE — PATIENT INSTRUCTIONS
Labs today-we will notify you with those results    Follow up if symptoms do not improve or worsen.

## 2021-09-15 ASSESSMENT — ANXIETY QUESTIONNAIRES: GAD7 TOTAL SCORE: 0

## 2021-09-17 PROBLEM — E89.0 POSTOPERATIVE HYPOTHYROIDISM: Status: ACTIVE | Noted: 2021-09-01

## 2021-09-17 PROBLEM — E05.00 GRAVES DISEASE: Status: ACTIVE | Noted: 2021-08-25

## 2021-10-10 ENCOUNTER — HEALTH MAINTENANCE LETTER (OUTPATIENT)
Age: 41
End: 2021-10-10

## 2022-02-17 PROBLEM — S76.211A INGUINAL STRAIN, RIGHT, INITIAL ENCOUNTER: Status: RESOLVED | Noted: 2017-09-19 | Resolved: 2021-09-17

## 2022-05-21 ENCOUNTER — HEALTH MAINTENANCE LETTER (OUTPATIENT)
Age: 42
End: 2022-05-21

## 2022-07-19 ENCOUNTER — OFFICE VISIT (OUTPATIENT)
Dept: OBGYN | Facility: CLINIC | Age: 42
End: 2022-07-19
Payer: COMMERCIAL

## 2022-07-19 VITALS
TEMPERATURE: 98.6 F | WEIGHT: 210.6 LBS | HEIGHT: 64 IN | HEART RATE: 96 BPM | SYSTOLIC BLOOD PRESSURE: 115 MMHG | BODY MASS INDEX: 35.96 KG/M2 | DIASTOLIC BLOOD PRESSURE: 75 MMHG | RESPIRATION RATE: 14 BRPM

## 2022-07-19 DIAGNOSIS — Z12.4 CERVICAL CANCER SCREENING: ICD-10-CM

## 2022-07-19 DIAGNOSIS — Z11.3 SCREEN FOR STD (SEXUALLY TRANSMITTED DISEASE): ICD-10-CM

## 2022-07-19 DIAGNOSIS — Z30.433 ENCOUNTER FOR REMOVAL AND REINSERTION OF INTRAUTERINE CONTRACEPTIVE DEVICE: ICD-10-CM

## 2022-07-19 DIAGNOSIS — Z30.433 ENCOUNTER FOR REMOVAL AND REINSERTION OF INTRAUTERINE CONTRACEPTIVE DEVICE (IUD): Primary | ICD-10-CM

## 2022-07-19 PROBLEM — Z30.430 ENCOUNTER FOR INSERTION OF MIRENA IUD: Status: ACTIVE | Noted: 2022-07-19

## 2022-07-19 PROCEDURE — 87591 N.GONORRHOEAE DNA AMP PROB: CPT | Performed by: OBSTETRICS & GYNECOLOGY

## 2022-07-19 PROCEDURE — 87624 HPV HI-RISK TYP POOLED RSLT: CPT | Performed by: OBSTETRICS & GYNECOLOGY

## 2022-07-19 PROCEDURE — 87491 CHLMYD TRACH DNA AMP PROBE: CPT | Performed by: OBSTETRICS & GYNECOLOGY

## 2022-07-19 PROCEDURE — 58301 REMOVE INTRAUTERINE DEVICE: CPT | Performed by: OBSTETRICS & GYNECOLOGY

## 2022-07-19 PROCEDURE — G0145 SCR C/V CYTO,THINLAYER,RESCR: HCPCS | Performed by: OBSTETRICS & GYNECOLOGY

## 2022-07-19 PROCEDURE — 58300 INSERT INTRAUTERINE DEVICE: CPT | Performed by: OBSTETRICS & GYNECOLOGY

## 2022-07-19 NOTE — NURSING NOTE
"Initial /75 (BP Location: Left arm, Patient Position: Sitting, Cuff Size: Adult Regular)   Pulse 96   Temp 98.6  F (37  C) (Tympanic)   Resp 14   Ht 1.626 m (5' 4\")   Wt 95.5 kg (210 lb 9.6 oz)   BMI 36.15 kg/m   Estimated body mass index is 36.15 kg/m  as calculated from the following:    Height as of this encounter: 1.626 m (5' 4\").    Weight as of this encounter: 95.5 kg (210 lb 9.6 oz). .      "

## 2022-07-19 NOTE — PROGRESS NOTES
Clinic Administered Medication Documentation    Administrations This Visit     levonorgestrel (MIRENA) 20 MCG/DAY IUD 20 mcg     Admin Date  07/19/2022 Action  Given Dose  20 mcg Route  Intrauterine Site   Administered By  Jai Stoddard MA    Ordering Provider: Roseanne Javier, DO    Patient Supplied?: No                  Intrauterine/Implant Insertion Documentation    Device was placed by provider (please see MAR for given by information). Please see MAR and medication order for additional information.     Type: Mirena  Remove/Replace by: Placed 07/19/2022 replace by 07/19/2029    Expiration Date:  10/2024

## 2022-07-19 NOTE — PROGRESS NOTES
[facility]    Gynecology Office Note    CC:   Chief Complaint   Patient presents with     Consult        HPI: Sridevi Donaldson is a 42 year old  who presents for IUD exchange and pap smear. Patient reports she had her IUD placed in 2017. Has been using for contraceptive management as well as menstrual management. Reports only occasional spotting, sometimes after intercourse or when her daughters have their menstrual cycle. No concerns today.    GYN Hx:     Patient is menopausal: no    No LMP recorded. (Menstrual status: IUD).    Last Pap Smear:       ROS: A 10 pt ROS was completed and found to be otherwise negative unless mentioned in the HPI.     PMH:   Past Medical History:   Diagnosis Date     Fibromyalgia      History of pulmonary embolism      Postoperative hypothyroidism        PSHx:   Past Surgical History:   Procedure Laterality Date     DILATION AND CURETTAGE       foot reconstruction Right      ovarian cyst removal       THYROIDECTOMY  2021       OBHx:   OB History    Para Term  AB Living   4 4 4 0 0 0   SAB IAB Ectopic Multiple Live Births   0 0 0 0 0      # Outcome Date GA Lbr Joe/2nd Weight Sex Delivery Anes PTL Lv   4 Term            3 Term            2 Term            1 Term                Medications:   levonorgestrel (MIRENA) 20 MCG/DAY IUD, 1 each (20 mcg) by Intrauterine route once  levothyroxine (SYNTHROID/LEVOTHROID) 137 MCG tablet, Take 137 mcg by mouth daily  pantoprazole (PROTONIX) 40 MG EC tablet,     No current facility-administered medications on file prior to visit.      Allergies:      Allergies   Allergen Reactions     Morphine Other (See Comments)     Red veins, gone after benadryl     Penicillin G Hives     Methimazole Rash       Social History:   Social History     Socioeconomic History     Marital status:      Spouse name: Not on file     Number of children: Not on file     Years of education: Not on file      "Highest education level: Not on file   Occupational History     Not on file   Tobacco Use     Smoking status: Never Smoker     Smokeless tobacco: Never Used   Substance and Sexual Activity     Alcohol use: Yes     Comment: occasional     Drug use: Never     Sexual activity: Yes   Other Topics Concern     Not on file   Social History Narrative     Not on file     Social Determinants of Health     Financial Resource Strain: Not on file   Food Insecurity: Not on file   Transportation Needs: Not on file   Physical Activity: Not on file   Stress: Not on file   Social Connections: Not on file   Intimate Partner Violence: Not on file   Housing Stability: Not on file         Family History:   No family history on file.    Physical Exam:   Vitals:    07/19/22 1136   BP: 115/75   BP Location: Left arm   Patient Position: Sitting   Cuff Size: Adult Regular   Pulse: 96   Resp: 14   Temp: 98.6  F (37  C)   TempSrc: Tympanic   Weight: 95.5 kg (210 lb 9.6 oz)   Height: 1.626 m (5' 4\")      Estimated body mass index is 36.15 kg/m  as calculated from the following:    Height as of this encounter: 1.626 m (5' 4\").    Weight as of this encounter: 95.5 kg (210 lb 9.6 oz).    General appearance: well-hydrated, A&O x 3, no apparent distress  Lungs: Equal expansion bilaterally, no accessory muscle use  Heart: No heaves or thrills.   Constitutional: See vitals  Extremities: no edema  Neuro: CN II-XII grossly intact  Genitourinary:  External genitalia: no erythema, no lesions.   Anus and Perineum: Unremarkable, no visible lesions  Vagina: Normal, healthy pink mucosa without any lesions. Physiologic vaginal discharge.   Cervix: normal appearance, no cervical motion tenderness.   Uterus: normal size    Labs/Imaging: UPT negative        IUD Removal and Replacement Procedure Note      Sridevi Donaldson  1980  [id number]    Indications:    Sridevi Donaldson is a 42 year old year old female, who is here today for IUD removal and " replacement.     The patient was counseled on the risks (including including risk of infection, uterine perforation, cramping), benefits (high efficacy, low maintenance birth control, reduced risk of uterine cancer and hyperplasia, improvement in menstrual bleeding), and alternatives of the procedure. Previous Mirena IUD was placed on 06/2017. Desires to continue this method.       Procedure:  Verbal and written consent were obtained. A UPT was negative prior to the procedure. The patient was placed in the dorsal lithotomy position.  A speculum was placed in the vagina and the cervix with IUD strings were visualized, grasped with a ring forcep and removed intact. The IUD was placed in a sterile cup and disposed in hazardous waste. The cervix was then cleaned with betadine swabs. A Single-tooth tenaculum was placed on the cervix to stabilize the cervix. The Mirena IUD was loaded, advanced to the fundus, pulled back 1cm, deployed and advanced to the fundus. Using the insertor as a sound, the uterus sounded to 9 cm. IUD strings were cut 3cm from the external cervical os. EBL: 0cc.       Post Procedure:    The patient tolerated the procedure well. No complications.    Patient was given post procedure instructions and return precautions.         Assessment and Plan:     Encounter Diagnoses   Name Primary?     Encounter for removal and reinsertion of intrauterine contraceptive device (IUD) Yes     Screen for STD (sexually transmitted disease)      Cervical cancer screening      Encounter for removal and reinsertion of intrauterine contraceptive device      IUD exchanged today. GC/C collected for routine screening.    Health maintenance: pap smear collected today.    Return to clinic as needed and for annual exams.    Roseanne Javier DO

## 2022-07-20 LAB
C TRACH DNA SPEC QL PROBE+SIG AMP: NEGATIVE
N GONORRHOEA DNA SPEC QL NAA+PROBE: NEGATIVE

## 2022-07-21 LAB
BKR LAB AP GYN ADEQUACY: NORMAL
BKR LAB AP GYN INTERPRETATION: NORMAL
BKR LAB AP HPV REFLEX: NORMAL
BKR LAB AP PREVIOUS ABNORMAL: NORMAL
PATH REPORT.COMMENTS IMP SPEC: NORMAL
PATH REPORT.COMMENTS IMP SPEC: NORMAL
PATH REPORT.RELEVANT HX SPEC: NORMAL

## 2022-07-25 LAB
HUMAN PAPILLOMA VIRUS 16 DNA: NEGATIVE
HUMAN PAPILLOMA VIRUS 18 DNA: NEGATIVE
HUMAN PAPILLOMA VIRUS FINAL DIAGNOSIS: NORMAL
HUMAN PAPILLOMA VIRUS OTHER HR: NEGATIVE

## 2022-09-18 ENCOUNTER — HEALTH MAINTENANCE LETTER (OUTPATIENT)
Age: 42
End: 2022-09-18

## 2022-10-25 ENCOUNTER — OFFICE VISIT (OUTPATIENT)
Dept: FAMILY MEDICINE | Facility: CLINIC | Age: 42
End: 2022-10-25
Payer: COMMERCIAL

## 2022-10-25 VITALS
RESPIRATION RATE: 16 BRPM | DIASTOLIC BLOOD PRESSURE: 62 MMHG | HEART RATE: 76 BPM | SYSTOLIC BLOOD PRESSURE: 110 MMHG | HEIGHT: 64 IN | BODY MASS INDEX: 36.54 KG/M2 | WEIGHT: 214 LBS

## 2022-10-25 DIAGNOSIS — G44.209 ACUTE NON INTRACTABLE TENSION-TYPE HEADACHE: ICD-10-CM

## 2022-10-25 DIAGNOSIS — F33.0 MILD EPISODE OF RECURRENT MAJOR DEPRESSIVE DISORDER (H): ICD-10-CM

## 2022-10-25 DIAGNOSIS — E89.0 POSTOPERATIVE HYPOTHYROIDISM: Primary | ICD-10-CM

## 2022-10-25 PROCEDURE — 99214 OFFICE O/P EST MOD 30 MIN: CPT | Performed by: NURSE PRACTITIONER

## 2022-10-25 RX ORDER — LEVOTHYROXINE SODIUM 125 UG/1
125 TABLET ORAL
COMMUNITY
Start: 2022-09-01 | End: 2023-09-19

## 2022-10-25 RX ORDER — CYCLOBENZAPRINE HCL 10 MG
5-10 TABLET ORAL 3 TIMES DAILY PRN
Qty: 30 TABLET | Refills: 3 | Status: SHIPPED | OUTPATIENT
Start: 2022-10-25 | End: 2023-03-28

## 2022-10-25 ASSESSMENT — PATIENT HEALTH QUESTIONNAIRE - PHQ9
SUM OF ALL RESPONSES TO PHQ QUESTIONS 1-9: 12
10. IF YOU CHECKED OFF ANY PROBLEMS, HOW DIFFICULT HAVE THESE PROBLEMS MADE IT FOR YOU TO DO YOUR WORK, TAKE CARE OF THINGS AT HOME, OR GET ALONG WITH OTHER PEOPLE: SOMEWHAT DIFFICULT
SUM OF ALL RESPONSES TO PHQ QUESTIONS 1-9: 12

## 2022-10-25 NOTE — PROGRESS NOTES
Assessment & Plan     Postoperative hypothyroidism  Endocrinology referral placed for local endocrinologist due to postoperative hypothyroidism  - Adult Endocrinology  Referral; Future    Acute non intractable tension-type headache  Headache consistent with a tension type headache, plan to do a trial of Flexeril as needed at bedtime to help with both tension and neck as well as sleep.  Symptomatic care and follow up discussed.  - cyclobenzaprine (FLEXERIL) 10 MG tablet; Take 0.5-1 tablets (5-10 mg) by mouth 3 times daily as needed for muscle spasms    Mild episode of recurrent major depressive disorder (H)  PHQ-9 elevated with significant fatigue and low energy.  Plan to work on sleep and see if symptoms improve.    Return in about 4 weeks (around 11/22/2022), or if symptoms worsen or fail to improve.    ERIN Ochoa CNP  [facility]    Subjective   BARRIE is a 42 year old, presenting for the following health issues:  Thyroid Problem (Graves disease - thyroidectomy )      History of Present Illness       Back Pain:  She presents for follow up of back pain. Patient's back pain is a chronic problem.  Location of back pain:  Right lower back, left lower back, right upper back, right shoulder, right buttock, left buttock and right hip  Description of back pain: dull ache, stabbing and other  Back pain spreads: nowhere    Since patient first noticed back pain, pain is: always present, but gets better and worse  Does back pain interfere with her job:  Not applicable      Hypothyroidism:     Since last visit, patient describes the following symptoms::  Fatigue, Loose stools and Weight gain    Weight gain::  >20 lbs.    Headaches:   Since the patient's last clinic visit, headaches are: worsened  The patient is getting headaches:  Headache everyday. Migraine once a week.  She is not able to do normal daily activities when she has a migraine.  The patient is taking the following  "rescue/relief medications:  Ibuprofen (Advil, Motrin), Tylenol and Excedrin   Patient states \"The relief is inconsistent\" from the rescue/relief medications.   The patient is taking the following medications to prevent migraines:  No medications to prevent migraines  In the past 4 weeks, the patient has gone to an Urgent Care or Emergency Room 0 times times due to headaches.    Reason for visit:  They took my thyroid out over a year ago and i still just dont feel good!    She eats 2-3 servings of fruits and vegetables daily.She consumes 2 sweetened beverage(s) daily.She exercises with enough effort to increase her heart rate 30 to 60 minutes per day.  She exercises with enough effort to increase her heart rate 3 or less days per week. She is missing 1 dose(s) of medications per week.  She is not taking prescribed medications regularly due to remembering to take.    Today's PHQ-9         PHQ-9 Total Score: 12    PHQ-9 Q9 Thoughts of better off dead/self-harm past 2 weeks :   Not at all    How difficult have these problems made it for you to do your work, take care of things at home, or get along with other people: Somewhat difficult  Tired all of the time, but unable to sleep.  Will wake up 3-4 hours before her alarm and not be able to fall back asleep.     Review of Systems   Constitutional, HEENT, cardiovascular, pulmonary, gi and gu systems are negative, except as otherwise noted.      Objective    /62   Pulse 76   Resp 16   Ht 1.626 m (5' 4\")   Wt 97.1 kg (214 lb)   BMI 36.73 kg/m    Body mass index is 36.73 kg/m .  Physical Exam   GENERAL: healthy, alert and no distress  NECK: no adenopathy  RESP: lungs clear to auscultation - no rales, rhonchi or wheezes  CV: regular rate and rhythm, normal S1 S2, no S3 or S4, no murmur  PSYCH: mentation appears normal, affect normal/bright                "

## 2022-10-25 NOTE — PATIENT INSTRUCTIONS
CHIEF COMPLAINT:  Back Pain and Forms Completion       HISTORY OF PRESENT ILLNESS:  Jameson Cristina is a pleasant and cooperative 40 year old male who complains of Back Pain and Forms Completion      The patient with history of chronic back pain on long term pain medications for low back pain. History of DJD, herniated disc,  Torn meniscus in left knee.  Currently on xtampsa and oxycodone. Pain level today is a 7/10 in the back. Described as a aching, sometimes shooting pain down his left leg.  Was doing leg lifts in physical therapy and started crying due to the pain.   Has left knee pain. Worse with hyperextension. Reports he is tip toeing around/walking gingerly,  Has seen orthopedics and a knee replacement is being considered.      Outpatient Medications Marked as Taking for the 2/27/20 encounter (Office Visit) with Shakir Hernandez, DO   Medication Sig Dispense Refill   • oxycodone (ROXICODONE) 30 MG immediate release tablet Take 1 tablet by mouth every 8 hours as needed for Pain. 90 tablet 0   • oxyCODONE ER 9 MG Capsule Extended Release 12 hour Abuse-Deterrent Take 1 capsule by mouth daily. 30 capsule 0   • ALPRAZolam (XANAX) 2 MG tablet Take 1 tablet by mouth 3 times daily as needed for Anxiety. 90 tablet 0   • tiZANidine (ZANAFLEX) 4 MG tablet Take 1 tablet by mouth every 8 hours as needed (muscle spasm). 90 tablet 0   • albuterol (PROAIR HFA) 108 (90 Base) MCG/ACT inhaler Inhale 2 puffs into the lungs every 4 hours as needed for Shortness of Breath or Wheezing. 1 Inhaler 5   • meloxicam (MOBIC) 15 MG tablet Take 1 tablet by mouth daily. 30 tablet 1   • ARIPiprazole, sensor, 5 MG Tab Take 5 mg by mouth daily. 30 tablet 5   • fluoxetine (PROZAC) 40 MG capsule Take 1 capsule by mouth 2 times daily. 60 capsule 5   • metoPROLOL tartrate (LOPRESSOR) 50 MG tablet Take 1 tablet by mouth 2 times daily. 180 tablet 1   • atorvastatin (LIPITOR) 10 MG tablet Take 1 tablet by mouth daily. 90 tablet 1   • Spacer/Aero-Holding  Try the Flexeril as needed for muscle relaxation    Alfred ([facility]) Misc Use as directed. 1 each 0   • ondansetron (ZOFRAN) 4 MG tablet Take 1 tablet by mouth every 12 hours as needed for Nausea. 60 tablet 0     ALLERGIES:   Allergen Reactions   • Duricef [Cefadroxil Monohydrate] HIVES      Past Medical History:   Diagnosis Date   • Anxiety    • Anxiety disorder    • Back pain     seeing pain management   • Chronic pain    • Depressive disorder    • Essential (primary) hypertension    • Fracture     ARM AND WRIST   • Insomnia      Past Surgical History:   Procedure Laterality Date   • Drain skin abscess simple Right 09/12/2016    right arm done in ED    • Foot surgery Right     [facility]  Dr. Ch   • Hardware removal Right 11/2013    RIGHT KNEE TIBILAL SCREW REMOVAL   • Humerus surgery Right     [facility]  Dr. Celeste   • Knee arthroscopy w/ meniscal repair Bilateral 10 years    [facility]     • Pilonidal cyst drainage     • Wrist fracture surgery Right     [facility]      Family History   Problem Relation Age of Onset   • Down Syndrome Sister        Social History     Tobacco Use   • Smoking status: Current Every Day Smoker     Packs/day: 0.50     Types: Cigarettes   • Smokeless tobacco: Never Used   Substance Use Topics   • Alcohol use: No   • Drug use: No        I have reviewed the patient's past medical, surgical, social and family history, updating these as appropriate.      REVIEW OF SYSTEMS    Constitutional: Patient relates  no fevers,  chills, or tiredness  Cardiovascular:  Patient relates no chest pain, dyspnea, or palpitations  Respiratory:         Patient relates no cough, shortness of breath, no hemoptysis  Gastrointestinal:  Patient relates constipation, (a little) no nausea, vomiting, diarrhea      PHYSICAL EXAMINATION:  Visit Vitals  /78 (BP Location: LUE - Left upper extremity, Patient Position: Sitting, Cuff Size: Regular)   Pulse 84   Temp 99 °F (37.2 °C)  (Temporal)   Resp 16   Ht 6' (1.829 m)   Wt 90.7 kg   SpO2 96%   BMI 27.12 kg/m²     GENERAL:  40 year old male alert, conversant, and in no acute distress.  HEAD:  Atraumatic, normocephalic  EYES:  Extraocular movements intact.  Pupils equal and reactive to light and accommodation, No scleral icterus  EARS:  Normal appearance, gross hearing intact. Tympanic membranes clear, light reflex present.  CARDIAC:  Regular rate and rhythm and no murmurs, clicks, or gallops.  Radial and carotid pulses 2+/4. No peripheral edema.  RESPIRATORY:  Normal respiratory effort. Clear to auscultation. No rales, rhonchi, or retraction.  No wheezes.  GASTROINTESTINAL: Abdomen soft, nontender, no hepatosplenomegaly, bowel sounds times 4.  MUSCULOSKELETAL: Antalgic gait and station, Upper Extremities: Muscle Strength 5/5. No digital cyanosis or clubbing. Lower Extremities: No digital cyanosis or clubbing. Muscle Strength 5/5.  Swelling along left knee.  NEUROLOGIC: CN (Cranial nerves) II-XII grossly intact, no tremors.  PSYCHIATRIC:  The patient is an 39 year old male.  The patient is awake, alert, oriented times 4, cooperative, and friendly.  Good grooming and hygiene.  Low risk for self harm or harm to others. Denies hallucination or delusions.      ASSESSMENT:  1. Lumbar radiculopathy, chronic    2. Anxiety    3. Back muscle spasm    4. Simple chronic bronchitis (CMS/HCC)         PLAN:    Jameson was seen today for back pain and forms completion.    Diagnoses and all orders for this visit:    Lumbar radiculopathy, chronic  - stable on medications, well controlled  - tolerating medications, continue medical therapy  -     oxycodone (ROXICODONE) 30 MG immediate release tablet; Take 1 tablet by mouth every 8 hours as needed for Pain.  -     oxyCODONE ER 9 MG Capsule Extended Release 12 hour Abuse-Deterrent; Take 1 capsule by mouth daily.    Anxiety  - stable on medications, well controlled  - tolerating medications, continue medical  therapy  -     ALPRAZolam (XANAX) 2 MG tablet; Take 1 tablet by mouth 3 times daily as needed for Anxiety.    Back muscle spasm        -   worsening spasm with physical activity like shoveling snow  -     tiZANidine (ZANAFLEX) 4 MG tablet; Take 1 tablet by mouth every 8 hours as needed (muscle spasm).    Simple chronic bronchitis (CMS/HCC)  - stable on medications, well controlled  - tolerating medications, continue medical therapy  -     albuterol (PROAIR HFA) 108 (90 Base) MCG/ACT inhaler; Inhale 2 puffs into the lungs every 4 hours as needed for Shortness of Breath or Wheezing.    Follow up in 1 months or sooner if there is no improvement in symptoms or any time sooner if questions, concerns or worsening symptoms.       Thank you, again, for entrusting me with your care. Please do not hesitate to contact me with any questions or concerns, or if I can be of any assistance.

## 2023-01-17 ENCOUNTER — TELEPHONE (OUTPATIENT)
Dept: FAMILY MEDICINE | Facility: CLINIC | Age: 43
End: 2023-01-17

## 2023-01-17 NOTE — TELEPHONE ENCOUNTER
Prior Authorization Retail Medication Request    Medication/Dose: Tirosint 137mcg caps  ICD code (if different than what is on RX):    Previously Tried and Failed:    Rationale:      Insurance Name:  UVALDO DUBOSE  Insurance ID:  [id number]  PHONE: [phone]      Pharmacy Information (if different than what is on RX)  Name:    Phone:      Thank you,  Yvette Mosquera,  Pharmacy [facility]

## 2023-01-20 NOTE — TELEPHONE ENCOUNTER
PA team only handles requests from [facility], P or HE.  This request comes from a provider from department below.  Please contact them for a PA.

## 2023-03-27 DIAGNOSIS — G44.209 ACUTE NON INTRACTABLE TENSION-TYPE HEADACHE: ICD-10-CM

## 2023-03-28 RX ORDER — CYCLOBENZAPRINE HCL 10 MG
TABLET ORAL
Qty: 30 TABLET | Refills: 3 | Status: SHIPPED | OUTPATIENT
Start: 2023-03-28 | End: 2023-09-19

## 2023-04-06 ENCOUNTER — TELEPHONE (OUTPATIENT)
Dept: OBGYN | Facility: CLINIC | Age: 43
End: 2023-04-06
Payer: COMMERCIAL

## 2023-04-06 NOTE — LETTER
2023      Sridevi Donaldson  [address] [place] [id number]      Dear Sridevi,    To ensure we are providing the best quality care, we have reviewed your chart and see that you are due for:    Colon Cancer Screening:    If you have received a referral to schedule a Colonoscopy or choose to do a Colonoscopy instead of the FIT/ Cologuard test, please call [phone] to schedule.    If you have received a FIT test kit from a prior office visit, please check the expiration date. If , please get a new kit from the Lab/ Clinic. If not , please complete the test and mail in as soon as you are able.    If you would prefer to complete a Cologuard test, please reach out to your provider to see if this is an option for you.    You may call Dept: [phone] if you have any questions. If you have completed the tests outside of [facility], please have the results forwarded to our office Fax # [phone]. We will update the chart for your primary Physician to review before your next annual physical.        Sincerely,      Roseanne Javier DO

## 2023-04-06 NOTE — TELEPHONE ENCOUNTER
Panel Management Review    Health Maintenance List    Health Maintenance   Topic Date Due     YEARLY PREVENTIVE VISIT  Never done     HF ACTION PLAN  Never done     LIPID  Never done     TSH W/FREE T4 REFLEX  Never done     URINE DRUG SCREEN  Never done     ADVANCE CARE PLANNING  Never done     DEPRESSION ACTION PLAN  Never done     HEPATITIS B IMMUNIZATION (1 of 3 - 3-dose series) Never done     COVID-19 Vaccine (1) Never done     COLORECTAL CANCER SCREENING  Never done     HIV SCREENING  Never done     HEPATITIS C SCREENING  Never done     MAMMO SCREENING  09/11/2018     BMP  03/14/2022     CBC  08/24/2022     INFLUENZA VACCINE (1) 09/01/2022     ALT  09/14/2022     PHQ-9  04/25/2023     DTAP/TDAP/TD IMMUNIZATION (2 - Td or Tdap) 10/17/2023     HPV TEST  07/19/2027     PAP  07/19/2027     Pneumococcal Vaccine: Pediatrics (0 to 5 Years) and At-Risk Patients (6 to 64 Years)  Aged Out     IPV IMMUNIZATION  Aged Out     MENINGITIS IMMUNIZATION  Aged Out       Composite cancer screening  Chart review shows that this patient is due/due soon for the following Colonoscopy  No results found for: PAP  Past Surgical History:   Procedure Laterality Date     DILATION AND CURETTAGE       foot reconstruction Right      ovarian cyst removal       THYROIDECTOMY  08/2021       Is hysterectomy listed in surgical history? No   Is mastectomy listed in surgical history? No     Summary:    Patient is due/failing the following:   Colonoscopy    Action needed: Patient needs office visit for Colonoscopy.    Type of outreach:  Sent [employer]X message.      Staff Signature:  Jai ruiz

## 2023-04-10 ENCOUNTER — TRANSFERRED RECORDS (OUTPATIENT)
Dept: HEALTH INFORMATION MANAGEMENT | Facility: CLINIC | Age: 43
End: 2023-04-10
Payer: COMMERCIAL

## 2023-05-07 ENCOUNTER — HEALTH MAINTENANCE LETTER (OUTPATIENT)
Age: 43
End: 2023-05-07

## 2023-06-04 ENCOUNTER — HEALTH MAINTENANCE LETTER (OUTPATIENT)
Age: 43
End: 2023-06-04

## 2023-06-06 ENCOUNTER — TRANSFERRED RECORDS (OUTPATIENT)
Dept: HEALTH INFORMATION MANAGEMENT | Facility: CLINIC | Age: 43
End: 2023-06-06
Payer: COMMERCIAL

## 2023-06-13 ENCOUNTER — APPOINTMENT (OUTPATIENT)
Dept: OCCUPATIONAL MEDICINE | Facility: CLINIC | Age: 43
End: 2023-06-13

## 2023-06-13 PROCEDURE — 97799 UNLISTED PHYSCL MED/REHAB PX: CPT | Performed by: NURSE PRACTITIONER

## 2023-06-13 PROCEDURE — 99499 UNLISTED E&M SERVICE: CPT | Performed by: NURSE PRACTITIONER

## 2023-07-02 ENCOUNTER — OFFICE VISIT (OUTPATIENT)
Dept: URGENT CARE | Facility: URGENT CARE | Age: 43
End: 2023-07-02
Payer: COMMERCIAL

## 2023-07-02 ENCOUNTER — TELEPHONE (OUTPATIENT)
Dept: NURSING | Facility: CLINIC | Age: 43
End: 2023-07-02

## 2023-07-02 VITALS
DIASTOLIC BLOOD PRESSURE: 80 MMHG | BODY MASS INDEX: 36.22 KG/M2 | SYSTOLIC BLOOD PRESSURE: 113 MMHG | HEART RATE: 113 BPM | OXYGEN SATURATION: 97 % | TEMPERATURE: 98.4 F | WEIGHT: 211 LBS

## 2023-07-02 DIAGNOSIS — H10.33 ACUTE BACTERIAL CONJUNCTIVITIS OF BOTH EYES: Primary | ICD-10-CM

## 2023-07-02 PROCEDURE — 99213 OFFICE O/P EST LOW 20 MIN: CPT | Performed by: EMERGENCY MEDICINE

## 2023-07-02 RX ORDER — OLOPATADINE HYDROCHLORIDE 1 MG/ML
1 SOLUTION/ DROPS OPHTHALMIC 2 TIMES DAILY
Qty: 5 ML | Refills: 0 | Status: SHIPPED | OUTPATIENT
Start: 2023-07-02 | End: 2023-07-09

## 2023-07-02 RX ORDER — OFLOXACIN 3 MG/ML
1-2 SOLUTION/ DROPS OPHTHALMIC
Qty: 7 ML | Refills: 0 | Status: SHIPPED | OUTPATIENT
Start: 2023-07-02 | End: 2023-07-09

## 2023-07-02 RX ORDER — LEVOTHYROXINE SODIUM 137 UG/1
CAPSULE ORAL
COMMUNITY
Start: 2023-05-28

## 2023-07-02 RX ORDER — LEVOTHYROXINE SODIUM 112 UG/1
TABLET ORAL
COMMUNITY
Start: 2022-07-27 | End: 2023-09-19

## 2023-07-02 NOTE — TELEPHONE ENCOUNTER
Stephanie PharmacistRobson calling to request alternative RX as pharmacy does not have prescribed RX in supply    Please send either one of following;    Tobramycin   moxifloxacin    Msg sent to Dr. Abdoulaye Arrington RN, Nurse Advisor 4:49 PM 7/2/2023

## 2023-07-02 NOTE — PROGRESS NOTES
CHIEF COMPLAINT: Possible infection both eyes      HPI: Patient is a 43-year-old female with a 2-day history of crusty exudate in her eyes.  She has had cold symptoms for several days.  No chronic eye disease.  No contact lens use.  No injury to her eyes.      ROS: See HPI otherwise normal.    Allergies   Allergen Reactions     Morphine Other (See Comments)     Red veins, gone after benadryl     Penicillin G Hives     Methimazole Rash      Current Outpatient Medications   Medication Sig Dispense Refill     cyclobenzaprine (FLEXERIL) 10 MG tablet TAKE ONE-HALF TO ONE TABLETS (5-10 MG) BY MOUTH 3 TIMES DAILY AS NEEDED FOR MUSCLE SPASMS 30 tablet 3     ofloxacin (OCUFLOX) 0.3 % ophthalmic solution Place 1-2 drops into both eyes every 2 hours (while awake) for 7 days 7 mL 0     olopatadine (PATANOL) 0.1 % ophthalmic solution Place 1 drop into both eyes 2 times daily for 7 days 5 mL 0     omeprazole (PRILOSEC) 20 MG DR capsule        TIROSINT 137 MCG capsule        levonorgestrel (MIRENA) 20 MCG/DAY IUD 1 each (20 mcg) by Intrauterine route once       levothyroxine (SYNTHROID/LEVOTHROID) 112 MCG tablet  (Patient not taking: Reported on 7/2/2023)       levothyroxine (SYNTHROID/LEVOTHROID) 125 MCG tablet Take 125 mcg by mouth           PE: No acute distress on exam.  Afebrile.  PERRLA.  EOMI.  Both eyes are mildly injected.  No periorbital cellulitis.        TREATMENT: None      ASSESSMENT: Conjunctivitis both eyes viral versus bacterial.  No complicate factors      DIAGNOSIS: Conjunctivitis both eyes      PLAN: Ofloxacin drops.  Additional information

## 2023-07-03 ENCOUNTER — TELEPHONE (OUTPATIENT)
Dept: URGENT CARE | Facility: URGENT CARE | Age: 43
End: 2023-07-03
Payer: COMMERCIAL

## 2023-07-03 DIAGNOSIS — H10.33 ACUTE BACTERIAL CONJUNCTIVITIS OF BOTH EYES: Primary | ICD-10-CM

## 2023-07-03 RX ORDER — MOXIFLOXACIN 5 MG/ML
1 SOLUTION/ DROPS OPHTHALMIC 3 TIMES DAILY
Qty: 3 ML | Refills: 0 | Status: SHIPPED | OUTPATIENT
Start: 2023-07-03 | End: 2023-07-10

## 2023-08-17 ENCOUNTER — ANCILLARY PROCEDURE (OUTPATIENT)
Dept: GENERAL RADIOLOGY | Facility: CLINIC | Age: 43
End: 2023-08-17
Payer: COMMERCIAL

## 2023-08-17 ENCOUNTER — OFFICE VISIT (OUTPATIENT)
Dept: URGENT CARE | Facility: URGENT CARE | Age: 43
End: 2023-08-17
Payer: COMMERCIAL

## 2023-08-17 VITALS
TEMPERATURE: 98.2 F | BODY MASS INDEX: 36.22 KG/M2 | SYSTOLIC BLOOD PRESSURE: 122 MMHG | DIASTOLIC BLOOD PRESSURE: 90 MMHG | WEIGHT: 211 LBS | OXYGEN SATURATION: 97 % | HEART RATE: 104 BPM | RESPIRATION RATE: 16 BRPM

## 2023-08-17 DIAGNOSIS — M25.562 ACUTE PAIN OF LEFT KNEE: ICD-10-CM

## 2023-08-17 DIAGNOSIS — M25.562 ACUTE PAIN OF LEFT KNEE: Primary | ICD-10-CM

## 2023-08-17 PROCEDURE — 73560 X-RAY EXAM OF KNEE 1 OR 2: CPT | Mod: TC | Performed by: RADIOLOGY

## 2023-08-17 PROCEDURE — 99214 OFFICE O/P EST MOD 30 MIN: CPT

## 2023-08-17 RX ORDER — MELOXICAM 15 MG/1
15 TABLET ORAL DAILY
Qty: 30 TABLET | Refills: 0 | Status: SHIPPED | OUTPATIENT
Start: 2023-08-17

## 2023-08-17 NOTE — PATIENT INSTRUCTIONS
"  Diagnosis: Orthopedic injury;     Today we did:  Xray:  negative for fracture or dislocation   Plan:   Avoid or restrict any activities that may aggravate your symptoms.   Cease exacerbating activity for 2 to 6 weeks, then gradually return to exercise/sport as tolerated.    wear brace for the next few weeks to assist with support.}  light stretching (when able to tolerate) to reduce your risks of developing a frozen joint or stiffness in joint   This will also help to increase strength and flexibility over time related to injury   Recovery expected within 2-6 weeks depending on severity, but some may take up to 6-12 months.  If symptoms fail to resolve or worsen recommending follow-up with orthopedics/physical therapy after allowing adequate time for healing. - will place referral today     P.R.I.C.E.  For musculoskeletal injuries including: sprains, strains, bruises - use the acronym P.R.I.C.E. for symptomatic treatment:   Prevent & Protect further injury.  Rest affected area   Ice applied (or heat, or can alternate)   Compression of injured area (ACE bandage/splint).  Elevation of injured area.    Pain  Ibuprofen / tylenol for pain   - Ibuprofen 600mg Q6hr as needed  (can use celebrex if GI upset or GI bleed risk)    - tylenol 500mg Q8hr   - Can use aleve / naproxen / naprosyn also   - if no contraindications recommend naproxen and or Gels/creams such as a Voltaren, and lidocaine patches or creams   No narcotics - no evidence to support this use and people tend to over use \"injured\" extremity when not having pain    (Pain is body's way of making you take it easy for awhile)   - orthopedic speciality will discuss stronger medications if needed indicated at that time    Monitor for:   Worsening pain   Worsening numbness and tingling   Loss of range of motion or strength   Redness, warmth or infection at site   Fevers, chills    "

## 2023-08-17 NOTE — PROGRESS NOTES
"URGENT CARE  Assessment & Plan   Assessment:   Sridevi Donaldson is a 43 year old female who's clinical presentation today is consistent with:   1. Acute pain of left knee  - XR Knee Standing Left 2 Views; Future  - meloxicam (MOBIC) 15 MG tablet;   - Orthopedic  Referral; Future  - Knee Supplies Order Knee Sleeve/Brace; Left; Open    Plan:  Radiologic films today were negative} for fractures or dislocation today, will treat patient at this time symptomatically and supportively, this will include encouraging: using NSAIDs/Tylenol to help decrease pain and inflammation, resting, applying ice/heat as needed, compression and elevation}  Offered patient a knee brace today, however she declined, stating she wants to look on [employer].   Educated patient to follow-up with their PCP or ortho in the next 1-2 weeks for further evaluation and reassessment, and due to the possibility of an occult fracture} also discussed to return immediatly  if symptoms worsen after today's visit.   No alarm signs or symptoms present   Differential Diagnoses for this patient's chief complaint that I considered include:  fracture, dislocation, Ligamentous vs tendon pathology, musculoskeletal injury, soft tissue injury     Patient is} agreeable to treatment plan and state they will follow-up if symptoms do not improve and/or if symptoms worsen   see patient's AVS 'monitor for' section for specific patient instructions given and discussed regarding what to watch for and when to follow up    ERIN Jin [facility] CARE [place]      ______________________________________________________________________      Subjective     HPI: Sridevi Donaldson \"BARRIE\"} is a 43 year old  female who presents today for evaluation the following concerns:   Patient presents endorsing left knee pain which started about a week and a half ago    Patient states this pain is not a related to a traumatic injury/accident and is acute  " "  patient states that they just noticed that all of a sudden her knee started hurting    Patient localizes the pain to the \"entire aspect\" , and states there is no  radiation of the pain to the calf or thigh   patient denies any associated} symptoms such as swelling, redness or bruising   Patient states their: Skin is intact}. ROM is \"normal\", has full range}, and their strength is normal}   Patient reports sensation is without numbness or tingling.   Self care to this point includes: rest, ice NSAIDs, and the helpfulness was minimal        Review of Systems:  Pertinent review of systems as reflected in HPI, otherwise negative.     Objective    Physical Exam:  Vitals:    08/17/23 1743 08/17/23 1745   BP: (!) 131/100 (!) 122/90   Pulse: 104    Resp: 16    Temp: 98.2  F (36.8  C)    TempSrc: Tympanic    SpO2: 97%    Weight: 95.7 kg (211 lb)       General:   alert and oriented, no acute distress, non ill-appearing   Vital signs reviewed: afebrile and normotensive     Psy/mental status: pleasant   SKIN: intact   Msk:   Left  knee: no  erythema, ecchymosis, bruising, or  inflammation present on the medial, lateral, anterior, posterior} aspect(s).   Increased tenderness/pain with palpation on the anterior  aspect(s).   No Decreased range of motion with flexion, extension, internal rotation, and external rotation}  Temperature equal} to body temperature.  No crepitus, no gross deformity, joint laxity, skin intact, and no laceration(s) present.   Can bear weight without increased pain.     Imaging:   All images were personally read by this provider (myself).   Per my independent interpretation the xray shows no fracture or dislocation      ______________________________________________________________________    I explained my diagnostic considerations and recommendations to the patient, who voiced understanding and agreement with the treatment plan.   All questions were answered.   We discussed potential side effects, " risks and benefits of any prescribed or recommended therapies, as well as expectations for response to treatments.  Please see AVS for any patient instructions & handouts given.   Patient was advised to contact the Nurse Care Line, their Primary Care provider, Urgent Care, or the Emergency Department if there are new or worsening symptoms, or call 911 for emergencies.

## 2023-09-13 ENCOUNTER — TRANSFERRED RECORDS (OUTPATIENT)
Dept: HEALTH INFORMATION MANAGEMENT | Facility: CLINIC | Age: 43
End: 2023-09-13
Payer: COMMERCIAL

## 2023-09-19 ENCOUNTER — OFFICE VISIT (OUTPATIENT)
Dept: URGENT CARE | Facility: URGENT CARE | Age: 43
End: 2023-09-19
Payer: COMMERCIAL

## 2023-09-19 VITALS
BODY MASS INDEX: 35.36 KG/M2 | OXYGEN SATURATION: 98 % | TEMPERATURE: 98.2 F | DIASTOLIC BLOOD PRESSURE: 95 MMHG | WEIGHT: 206 LBS | HEART RATE: 94 BPM | SYSTOLIC BLOOD PRESSURE: 136 MMHG | RESPIRATION RATE: 24 BRPM

## 2023-09-19 DIAGNOSIS — J02.9 PHARYNGITIS, UNSPECIFIED ETIOLOGY: ICD-10-CM

## 2023-09-19 DIAGNOSIS — R05.1 ACUTE COUGH: Primary | ICD-10-CM

## 2023-09-19 LAB
DEPRECATED S PYO AG THROAT QL EIA: NEGATIVE
GROUP A STREP BY PCR: NOT DETECTED
SARS-COV-2 RNA RESP QL NAA+PROBE: NEGATIVE

## 2023-09-19 PROCEDURE — 87651 STREP A DNA AMP PROBE: CPT | Performed by: PHYSICIAN ASSISTANT

## 2023-09-19 PROCEDURE — 99213 OFFICE O/P EST LOW 20 MIN: CPT | Performed by: PHYSICIAN ASSISTANT

## 2023-09-19 PROCEDURE — 87635 SARS-COV-2 COVID-19 AMP PRB: CPT | Performed by: PHYSICIAN ASSISTANT

## 2023-09-19 RX ORDER — PREDNISONE 20 MG/1
40 TABLET ORAL DAILY
Qty: 10 TABLET | Refills: 0 | Status: SHIPPED | OUTPATIENT
Start: 2023-09-19 | End: 2023-09-24

## 2023-09-19 RX ORDER — ALBUTEROL SULFATE 90 UG/1
AEROSOL, METERED RESPIRATORY (INHALATION)
Qty: 18 G | Refills: 0 | Status: SHIPPED | OUTPATIENT
Start: 2023-09-19

## 2023-09-19 NOTE — LETTER
September 19, 2023      Sridevi Donaldson  [address] [place] [id number]        To Whom It May Concern:    Sridevi Donaldson  was seen and treated in clinic and missed work 9/19/23 and 9/20/23.        Sincerely,          Berta Esparza PA-C

## 2023-09-19 NOTE — PROGRESS NOTES
"  Assessment & Plan     Acute cough  Reassurance, exam and vital signs are normal. Will treat with prednisone burst and albuterol every 4-6 hrs as needed. Continue with supportive care. Return to clinic if symptoms worsen or do not improve; otherwise follow up as needed       - Symptomatic COVID-19 Virus (Coronavirus) by PCR Nose  - predniSONE (DELTASONE) 20 MG tablet; Take 2 tablets (40 mg) by mouth daily for 5 days  - albuterol (PROAIR HFA/PROVENTIL HFA/VENTOLIN HFA) 108 (90 Base) MCG/ACT inhaler; Inhale 2 puffs every 4-6 hours as needed for cough, wheezing, or shortness of breath    Pharyngitis, unspecified etiology    - Streptococcus A Rapid Screen w/Reflex to PCR - Clinic Collect  - Symptomatic COVID-19 Virus (Coronavirus) by PCR Nose  - Group A Streptococcus PCR Throat Swab             BMI:   Estimated body mass index is 35.36 kg/m  as calculated from the following:    Height as of 10/25/22: 1.626 m (5' 4\").    Weight as of this encounter: 93.4 kg (206 lb).           Return in about 1 week (around 9/26/2023), or if symptoms worsen or fail to improve.    AZALEA Frank [facility] [place]              Subjective   Chief Complaint   Patient presents with    upper respiratory problem     Cough, headaches, chest hurts, ribs hurts, sore throat, chills, sweats at night x 1.5 weeks       HPI     URI Adult    Onset of symptoms was 1.5 week(s) ago.  Course of illness is same.    Severity moderate  Current and Associated symptoms: cough, sore throat, chills  Treatment measures tried include mucinex  Predisposing factors include None.              Review of Systems         Objective    BP (!) 136/95   Pulse 94   Temp 98.2  F (36.8  C) (Tympanic)   Resp 24   Wt 93.4 kg (206 lb)   SpO2 98%   BMI 35.36 kg/m    Body mass index is 35.36 kg/m .  Physical Exam  Constitutional:       Appearance: She is well-developed.   HENT:      Head: Normocephalic.      Right Ear: Tympanic membrane and ear " canal normal.      Left Ear: Tympanic membrane and ear canal normal.      Mouth/Throat:      Pharynx: Posterior oropharyngeal erythema present.   Eyes:      Conjunctiva/sclera: Conjunctivae normal.   Cardiovascular:      Rate and Rhythm: Normal rate.      Heart sounds: Normal heart sounds.   Pulmonary:      Effort: Pulmonary effort is normal.      Breath sounds: Normal breath sounds.   Skin:     General: Skin is warm and dry.      Findings: No rash.   Psychiatric:         Behavior: Behavior normal.

## 2023-10-10 ENCOUNTER — TRANSFERRED RECORDS (OUTPATIENT)
Dept: HEALTH INFORMATION MANAGEMENT | Facility: CLINIC | Age: 43
End: 2023-10-10
Payer: COMMERCIAL

## 2023-12-15 ENCOUNTER — TELEPHONE (OUTPATIENT)
Dept: FAMILY MEDICINE | Facility: CLINIC | Age: 43
End: 2023-12-15
Payer: COMMERCIAL

## 2023-12-15 NOTE — TELEPHONE ENCOUNTER
Patient Quality Outreach    Patient is due for the following:   Hypertension -  BP check  Colon Cancer Screening  Breast Cancer Screening - Mammogram  Depression  -  PHQ-9 needed  Physical Preventive Adult Physical      Topic Date Due    Hepatitis B Vaccine (1 of 3 - 3-dose series) Never done    COVID-19 Vaccine (1) Never done    Flu Vaccine (1) 09/01/2023    Diptheria Tetanus Pertussis (DTAP/TDAP/TD) Vaccine (2 - Td or Tdap) 10/17/2023       Next Steps:   Schedule a Adult Preventative    Type of outreach:    Sent Nambii message.      Questions for provider review:    None           Bailee Kahler

## 2023-12-27 ENCOUNTER — OFFICE VISIT (OUTPATIENT)
Dept: URGENT CARE | Facility: URGENT CARE | Age: 43
End: 2023-12-27
Payer: COMMERCIAL

## 2023-12-27 VITALS
HEART RATE: 80 BPM | SYSTOLIC BLOOD PRESSURE: 130 MMHG | BODY MASS INDEX: 35.02 KG/M2 | WEIGHT: 204 LBS | TEMPERATURE: 98.5 F | OXYGEN SATURATION: 99 % | DIASTOLIC BLOOD PRESSURE: 93 MMHG

## 2023-12-27 DIAGNOSIS — H65.93 BILATERAL OTITIS MEDIA WITH EFFUSION: Primary | ICD-10-CM

## 2023-12-27 LAB
DEPRECATED S PYO AG THROAT QL EIA: NEGATIVE
GROUP A STREP BY PCR: NOT DETECTED

## 2023-12-27 PROCEDURE — 99213 OFFICE O/P EST LOW 20 MIN: CPT

## 2023-12-27 PROCEDURE — 87651 STREP A DNA AMP PROBE: CPT

## 2023-12-27 NOTE — PROGRESS NOTES
URGENT CARE  Assessment & Plan   Assessment:   Sridevi Donaldson is a 43 year old female who's clinical presentation today is consistent with:   1. Bilateral otitis media with effusion  - Streptococcus A Rapid Screen w/Reflex to PCR   Plan:  Will treat patient's middle ear effusion/ fluid collection (without acute signs of infection) today,  symptomatically and supportively. This will include: warm packs, avoidance of allergens, using antihistamines and decongestants  Educated patient that should OME become persistent, it is reasonable to follow up with PCP or ENT specialist for speech and hearing evaluation to avoid any complications, additionally to rule out any eustachian tube dysfunction or need for myringotomy tubes  We also discussed if symptoms do not improve after starting today's treatment plan (or if symptoms worsen) to follow up in 10-14} days.    No alarm signs or symptoms present   Differential Diagnoses for this patient's chief complaint that I considered include:  AOM, OME, otitis externa, viral URI, other bacterial pathology, ceruminosis, eustachian tube dysfunction,     Patient is} agreeable to treatment plan and state they will follow-up if symptoms do not improve and/or if symptoms worsen   see patient's AVS 'monitor for' section for specific patient instructions given and discussed regarding what to watch for and when to follow up    ERIN Jin [facility] CARE [place]      ______________________________________________________________________      Subjective     HPI: Sridevi Donaldson  is a 43 year old  female who presents today for evaluation the following concerns:   Patient  endorses cold / flu symptoms today which started 6 days ago    Patient reports ear pain, sore throat, and headache    Patient denies any wheezing, shortness of breath, difficulty breathing, chest pain, weakness or signs of dehydration   Patient endorses her most bothersome symptom is her   ear pain       Review of Systems:  Pertinent review of systems as reflected in HPI, otherwise negative.     Objective    Physical Exam:  Vitals:    12/27/23 1655   BP: (!) 130/93   Pulse: 80   Temp: 98.5  F (36.9  C)   TempSrc: Tympanic   SpO2: 99%   Weight: 92.5 kg (204 lb)      General: Alert and oriented, no acute distress, Vital signs reviewed: afebrile,  normotensive   Psy/mental status: Cooperative, nonanxious  SKIN: Intact, no rashes  EYES: EOMs intact, PERRLA bilaterally   Conjunctiva: Clear bilaterally, no injection or erythema present  EARS:   Bilateral TMs intact   Slight bulging noted bilaterally} but with translucency preserved   no signs of acute otitis media infection, consistent with middle ear fluid/congestion   Bilateral- Canals are without swelling, mild cerumen, No impaction  NOSE:  mucosa moist               No frontal or maxillary sinus tenderness present bilaterally  MOUTH/THROAT: lips, tongue, & oral mucosa appear normal upon inspection                Posterior oropharynx is erythematous but without exudate, lesions or tonsillar  Edema, no dysphonia, no unilateral tonsillar edema, no uvular deviation,   no signs of peritonsillar abscess  NECK: supple, has full range of motion with no meningeal signs              No lymphadenopathy present  LUNG: normal work of breathing, good respiratory effort without retractions, good air  movement, non labored, inspection reveals normal chest expansion w/  inspiration            Lung sounds are clear to auscultation bilaterally,            No rales/rhonic/crackles wheezing noted           No cough noted     LABS:   Results for orders placed or performed in visit on 12/27/23   Streptococcus A Rapid Screen w/Reflex to PCR - Clinic Collect     Status: Normal    Specimen: Throat; Swab   Result Value Ref Range    Group A Strep antigen Negative Negative        ______________________________________________________________________    I explained my diagnostic  considerations and recommendations to the patient, who voiced understanding and agreement with the treatment plan.   All questions were answered.   We discussed potential side effects, risks and benefits of any prescribed or recommended therapies, as well as expectations for response to treatments.  Please see AVS for any patient instructions & handouts given.   Patient was advised to contact the Nurse Care Line, their Primary Care provider, Urgent Care, or the Emergency Department if there are new or worsening symptoms, or call 911 for emergencies.

## 2024-05-08 ENCOUNTER — TRANSFERRED RECORDS (OUTPATIENT)
Dept: HEALTH INFORMATION MANAGEMENT | Facility: CLINIC | Age: 44
End: 2024-05-08

## 2024-07-14 ENCOUNTER — HEALTH MAINTENANCE LETTER (OUTPATIENT)
Age: 44
End: 2024-07-14

## 2024-08-28 ENCOUNTER — TRANSFERRED RECORDS (OUTPATIENT)
Dept: HEALTH INFORMATION MANAGEMENT | Facility: CLINIC | Age: 44
End: 2024-08-28

## 2024-09-03 ENCOUNTER — TELEPHONE (OUTPATIENT)
Dept: FAMILY MEDICINE | Facility: CLINIC | Age: 44
End: 2024-09-03

## 2024-09-03 NOTE — TELEPHONE ENCOUNTER
"Message came from \"PT CRM request\" please response by mychart message.     Naina KNIGHT  Station     ----  Regarding foot surgery records  (Newest Message First)  Татьяна Harkins routed conversation to [facility] Pool19 hours ago (2:43 PM)     Sridevi Donaldson \"BARRIE\"  P [employer] Access [id number] hours ago (2:34 PM)     KH  Topic: Non-Medical Question.     I m just wondering if you can check my records and tell me if I have actual screws in my foot that would stop me from getting an mri!?      "

## 2024-09-07 ENCOUNTER — TRANSFERRED RECORDS (OUTPATIENT)
Dept: HEALTH INFORMATION MANAGEMENT | Facility: CLINIC | Age: 44
End: 2024-09-07

## 2024-09-10 ENCOUNTER — TRANSFERRED RECORDS (OUTPATIENT)
Dept: HEALTH INFORMATION MANAGEMENT | Facility: CLINIC | Age: 44
End: 2024-09-10

## 2024-10-09 ENCOUNTER — TRANSFERRED RECORDS (OUTPATIENT)
Dept: HEALTH INFORMATION MANAGEMENT | Facility: CLINIC | Age: 44
End: 2024-10-09
Payer: COMMERCIAL

## 2024-10-28 ENCOUNTER — MYC MEDICAL ADVICE (OUTPATIENT)
Dept: FAMILY MEDICINE | Facility: CLINIC | Age: 44
End: 2024-10-28

## 2024-10-28 ENCOUNTER — OFFICE VISIT (OUTPATIENT)
Dept: FAMILY MEDICINE | Facility: CLINIC | Age: 44
End: 2024-10-28
Payer: COMMERCIAL

## 2024-10-28 VITALS
HEART RATE: 90 BPM | SYSTOLIC BLOOD PRESSURE: 132 MMHG | RESPIRATION RATE: 14 BRPM | BODY MASS INDEX: 33.12 KG/M2 | HEIGHT: 64 IN | WEIGHT: 194 LBS | TEMPERATURE: 98 F | OXYGEN SATURATION: 100 % | DIASTOLIC BLOOD PRESSURE: 88 MMHG

## 2024-10-28 DIAGNOSIS — M22.42 CHONDROMALACIA OF PATELLA, LEFT: ICD-10-CM

## 2024-10-28 DIAGNOSIS — M22.2X2 PATELLOFEMORAL DISORDER OF LEFT KNEE: ICD-10-CM

## 2024-10-28 DIAGNOSIS — Z86.79 HISTORY OF ACUTE HEART FAILURE: ICD-10-CM

## 2024-10-28 DIAGNOSIS — Z01.818 PREOP GENERAL PHYSICAL EXAM: Primary | ICD-10-CM

## 2024-10-28 DIAGNOSIS — E89.0 POSTOPERATIVE HYPOTHYROIDISM: ICD-10-CM

## 2024-10-28 DIAGNOSIS — Z12.11 SCREEN FOR COLON CANCER: ICD-10-CM

## 2024-10-28 PROBLEM — R05.9 COUGH: Status: RESOLVED | Noted: 2017-09-19 | Resolved: 2024-10-28

## 2024-10-28 LAB — HCG UR QL: NEGATIVE

## 2024-10-28 PROCEDURE — 81025 URINE PREGNANCY TEST: CPT | Performed by: NURSE PRACTITIONER

## 2024-10-28 PROCEDURE — 93000 ELECTROCARDIOGRAM COMPLETE: CPT | Performed by: NURSE PRACTITIONER

## 2024-10-28 PROCEDURE — 99214 OFFICE O/P EST MOD 30 MIN: CPT | Performed by: NURSE PRACTITIONER

## 2024-10-28 RX ORDER — CHOLECALCIFEROL (VITAMIN D3) 50 MCG
50 TABLET ORAL DAILY
COMMUNITY

## 2024-10-28 ASSESSMENT — PATIENT HEALTH QUESTIONNAIRE - PHQ9
SUM OF ALL RESPONSES TO PHQ QUESTIONS 1-9: 5
10. IF YOU CHECKED OFF ANY PROBLEMS, HOW DIFFICULT HAVE THESE PROBLEMS MADE IT FOR YOU TO DO YOUR WORK, TAKE CARE OF THINGS AT HOME, OR GET ALONG WITH OTHER PEOPLE: SOMEWHAT DIFFICULT
SUM OF ALL RESPONSES TO PHQ QUESTIONS 1-9: 5

## 2024-10-28 ASSESSMENT — PAIN SCALES - GENERAL: PAINLEVEL_OUTOF10: MODERATE PAIN (5)

## 2024-10-28 NOTE — PROGRESS NOTES
Preoperative Evaluation  [facility]  [address] [place] [id number]  Phone: [phone]  Fax: [phone]  Primary Provider: ERIN Ochoa CNP  Pre-op Performing Provider: ERIN Ochoa CNP  Oct 28, 2024           10/24/2024   Surgical Information   What procedure is being done? Left knee    Facility or Hospital where procedure/surgery will be performed: [facility]    Who is doing the procedure / surgery? Dr Rutledge (spelling?)    Date of surgery / procedure: 11/4    Time of surgery / procedure: Not sure yet will call day before    Where do you plan to recover after surgery? at home with family        Patient-reported     Fax number for surgical facility: [phone]     Assessment & Plan     The proposed surgical procedure is considered INTERMEDIATE risk.    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, left    (E89.0) Postoperative hypothyroidism  Comment: TSH 0.36 8/30/24    (Z86.79) History of acute heart failure  Due to hyperthyroidism, resolved  Plan: EKG 12-lead complete w/read - Clinics         (Z12.11) Screen for colon cancer  Comment: due for screening once recovered  Plan: Colonoscopy Screening  Referral        Risks and Recommendations  The patient has the following additional risks and recommendations for perioperative complications:  Anemia/Bleeding/Clotting:    - History of DVT or PE, consider DVT prevention postoperatively    Antiplatelet or Anticoagulation Medication Instructions   - Patient is on no antiplatelet or anticoagulation medications.    Additional Medication Instructions  Take all scheduled medications on the day of surgery EXCEPT for modifications listed below:   - Herbal medications and vitamins: DO NOT TAKE 14 days prior to surgery.   - naproxen (Aleve,  Naprosyn): DO NOT TAKE 7 days before surgery.     Recommendation  Approval given to proceed with proposed procedure, without further diagnostic evaluation.    Subjective   BARRIE is a 44 year old, presenting for the following:  Pre-Op Exam and Allergies (Would like adhesive added to allergy list- blistering around site (happened with steri strips and EKG sticker). )          10/28/2024     8:38 AM   Additional Questions   Roomed by Dana PETIT   Accompanied by self         10/28/2024     8:38 AM   Patient Reported Additional Medications   Patient reports taking the following new medications Vitamin D, thyroid OTC med      HPI related to upcoming procedure: Left knee patellofemoral syndrome with focal lateral femoral condyle chondromalacia not improved with interventions, plan for surgery         10/24/2024   Pre-Op Questionnaire   Have you ever had a heart attack or stroke? No   Have you ever had surgery on your heart or blood vessels, such as a stent placement, a coronary artery bypass, or surgery on an artery in your head, neck, heart, or legs? No    Do you have chest pain with activity? No    Do you have a history of heart failure? Yes- due to hyperthyroidism -resolved with treatment   Do you currently have a cold, bronchitis or symptoms of other infection? No    Do you have a cough, shortness of breath, or wheezing? No    Do you or anyone in your family have previous history of blood clots? (!) YES -PE with pregnancy, mom same    Do you or does anyone in your family have a serious bleeding problem such as prolonged bleeding following surgeries or cuts? (!) UNKNOWN -none known    Have you ever had problems with anemia or been told to take iron pills? (!) YES - in the past    Have you had any abnormal blood loss such as black, tarry or bloody stools, or abnormal vaginal bleeding? No    Have you ever had a blood transfusion? (!) YES    Have you ever had a transfusion reaction? No    Are you willing to have a blood  transfusion if it is medically needed before, during, or after your surgery? Yes    Have you or any of your relatives ever had problems with anesthesia? (!) YES -self gets nausea/vomiting    Do you have sleep apnea, excessive snoring or daytime drowsiness? (!) YES- negative sleep study    Do you have a CPAP machine? (!) NO    Do you have any artifical heart valves or other implanted medical devices like a pacemaker, defibrillator, or continuous glucose monitor? No    Do you have artificial joints? No    Are you allergic to latex? No        Patient-reported     Health Care Directive  Patient does not have a Health Care Directive: Discussed advance care planning with patient; information given to patient to review.    Preoperative Review of    reviewed - controlled substances prescribed by other outside provider(s).      Status of Chronic Conditions:  See problem list for active medical problems.  Problems all longstanding and stable, except as noted/documented.  See ROS for pertinent symptoms related to these conditions.    Patient Active Problem List    Diagnosis Date Noted    Encounter for insertion of Mirena IUD 07/19/2022     Priority: Medium     Mirena placed 07/19/2022 remove by 07/19/2029    NDC 23814-716-58  Lot [id number]  Exp 10/2024        Postoperative hypothyroidism 09/01/2021     Priority: Medium    Graves disease 08/25/2021     Priority: Medium    Cough 09/19/2017     Priority: Medium    Headache 09/19/2017     Priority: Medium    Major Depression, Recurrent      Priority: Medium     Created by Conversion  Replacement Utility updated for latest IMO load        Hx pulmonary embolism 11/19/2015     Priority: Medium    Obesity      Priority: Medium     Created by Conversion        Myofascial Pain Syndrome      Priority: Medium     Created by Conversion        Varicose Veins      Priority: Medium     Created by Conversion        Joint Pain Fingers      Priority: Medium     Created by Conversion         Neck Pain      Priority: Medium     Created by Conversion          Past Medical History:   Diagnosis Date    Arthritis     Congestive heart failure (H) 8/25/2021    Depressive disorder     Encounter for insertion of Mirena IUD 07/19/2022    Mirena placed 07/19/2022 remove by 07/19/2029  NDC 26501-921-97 Lot [id number] Exp 10/2024     Fibromyalgia     History of blood transfusion 1999    History of pulmonary embolism     Postoperative hypothyroidism     Uncomplicated asthma 1990     Past Surgical History:   Procedure Laterality Date    ABDOMEN SURGERY      DILATION AND CURETTAGE      ENT SURGERY  8/31/2021    Thyroid removed    foot reconstruction Right     ORTHOPEDIC SURGERY      ovarian cyst removal      THYROIDECTOMY  08/2021     Current Outpatient Medications   Medication Sig Dispense Refill    levonorgestrel (MIRENA) 20 MCG/DAY IUD 1 each (20 mcg) by Intrauterine route once      TIROSINT 137 MCG capsule       albuterol (PROAIR HFA/PROVENTIL HFA/VENTOLIN HFA) 108 (90 Base) MCG/ACT inhaler Inhale 2 puffs every 4-6 hours as needed for cough, wheezing, or shortness of breath (Patient not taking: Reported on 10/28/2024) 18 g 0    cyclobenzaprine (FLEXERIL) 10 MG tablet TAKE ONE-HALF TO ONE TABLETS (5-10 MG) BY MOUTH 3 TIMES DAILY AS NEEDED FOR MUSCLE SPASMS 30 tablet 0    meloxicam (MOBIC) 15 MG tablet Take 1 tablet (15 mg) by mouth daily 30 tablet 0    omeprazole (PRILOSEC) 20 MG DR capsule          Allergies   Allergen Reactions    Morphine Other (See Comments)     Red veins, gone after benadryl    Penicillin G Hives    Methimazole Rash        Social History     Tobacco Use    Smoking status: Never    Smokeless tobacco: Never   Substance Use Topics    Alcohol use: Not Currently     Comment: occasional     History   Drug Use Unknown         Review of Systems  CONSTITUTIONAL: NEGATIVE for fever, chills, change in weight  INTEGUMENTARY/SKIN: NEGATIVE for worrisome rashes, moles or lesions  EYES: NEGATIVE for vision  "changes or irritation  ENT/MOUTH: NEGATIVE for ear, mouth and throat problems  RESP: NEGATIVE for significant cough or SOB  CV: NEGATIVE for chest pain, palpitations or peripheral edema  GI: NEGATIVE for nausea, abdominal pain, heartburn, or change in bowel habits  : NEGATIVE for frequency, dysuria, or hematuria  MUSCULOSKELETAL:POSITIVE  for joint pain bilateral knee  NEURO: NEGATIVE for weakness, dizziness or paresthesias  ENDOCRINE: NEGATIVE for temperature intolerance, skin/hair changes  HEME: NEGATIVE for bleeding problems  PSYCHIATRIC: NEGATIVE for changes in mood or affect    Objective    Vitals:    10/28/24 0850   BP: 132/88   Pulse: 90   Resp: 14   Temp: 98  F (36.7  C)   TempSrc: Tympanic   SpO2: 100%   Weight: 88 kg (194 lb)   Height: 1.626 m (5' 4\")      Estimated body mass index is 35.02 kg/m  as calculated from the following:    Height as of 10/25/22: 1.626 m (5' 4\").    Weight as of 12/27/23: 92.5 kg (204 lb).  Physical Exam  GENERAL: alert and no distress  EYES: Eyes grossly normal to inspection, PERRL and conjunctivae and sclerae normal  HENT: ear canals and TM's normal, nose and mouth without ulcers or lesions  NECK: no adenopathy, no asymmetry, masses, or scars  RESP: lungs clear to auscultation - no rales, rhonchi or wheezes  CV: regular rate and rhythm, normal S1 S2, no S3 or S4, no murmur, click or rub, no peripheral edema  ABDOMEN: soft, nontender, no hepatosplenomegaly, no masses and bowel sounds normal  MS: no gross musculoskeletal defects noted, no edema  SKIN: no suspicious lesions or rashes  NEURO: Normal strength and tone, mentation intact and speech normal  PSYCH: mentation appears normal, affect normal/bright    Diagnostics  Recent Results (from the past 24 hours)   HCG qualitative urine    Collection Time: 10/28/24  9:28 AM   Result Value Ref Range    hCG Urine Qualitative Negative Negative      EKG: appears normal, NSR, normal axis, normal intervals, no acute ST/T changes c/w " ischemia, no LVH by voltage criteria    Revised Cardiac Risk Index (RCRI)  The patient has the following serious cardiovascular risks for perioperative complications:   - No serious cardiac risks = 0 points     RCRI Interpretation: 0 points: Class I (very low risk - 0.4% complication rate)         Signed Electronically by: ERIN Ochoa CNP  A copy of this evaluation report is provided to the requesting physician.

## 2024-10-28 NOTE — PATIENT INSTRUCTIONS
How to Take Your Medication Before Surgery  Preoperative Medication Instructions   Additional Medication Instructions  Take all scheduled medications on the day of surgery EXCEPT for modifications listed below:   - Herbal medications and vitamins: DO NOT TAKE 14 days prior to surgery.   - naproxen (Aleve, Naprosyn): DO NOT TAKE 7 days before surgery.        Patient Education   Preparing for Your Surgery  For Adults  Getting started  In most cases, a nurse will call to review your health history and instructions. They will give you an arrival time based on your scheduled surgery time. Please be ready to share:  Your doctor's clinic name and phone number  Your medical, surgical, and anesthesia history  A list of allergies and sensitivities  A list of medicines, including herbal treatments and over-the-counter drugs  Whether the patient has a legal guardian (ask how to send us the papers in advance)  Note: You may not receive a call if you were seen at our PAC (Preoperative [facility] Center).  Please tell us if you're pregnant--or if there's any chance you might be pregnant. Some surgeries may injure a fetus (unborn baby), so they require a pregnancy test. Surgeries that are safe for a fetus don't always need a test, and you can choose whether to have one.   Preparing for surgery  Within 10 to 30 days of surgery: Have a pre-op exam (sometimes called an H&P, or History and Physical). This can be done at a clinic or pre-operative center.  If you're having a , you may not need this exam. Talk to your care team.  At your pre-op exam, talk to your care team about all medicines you take. (This includes CBD oil and any drugs, such as THC, marijuana, and other forms of cannabis.) If you need to stop any medicine before surgery, ask when to start taking it again.  This is for your safety. Many medicines and drugs can make you bleed too much during surgery. Some change how well surgery (anesthesia) drugs work.  Call  your insurance company to let them know you're having surgery. (If you don't have insurance, call [phone].)  Call your clinic if there's any change in your health. This includes a scrape or scratch near the surgery site, or any signs of a cold (sore throat, runny nose, cough, rash, fever).  Eating and drinking guidelines  For your safety: Unless your surgeon tells you otherwise, follow the guidelines below.  Eat and drink as normal until 8 hours before you arrive for surgery. After that, no food or milk. You can spit out gum when you arrive.  Drink clear liquids until 2 hours before you arrive. These are liquids you can see through, like water, Gatorade, and Propel Water. They also include plain black coffee and tea (no cream or milk).  No alcohol for 24 hours before you arrive. The night before surgery, stop any drinks that contain THC.  If your care team tells you to take medicine on the morning of surgery, it's okay to take it with a sip of water. No other medicines or drugs are allowed (including CBD oil)--follow your care team's instructions.  If you have questions the day of surgery, call your hospital or surgery center.   Preventing infection  Shower or bathe the night before and the morning of surgery. Follow the instructions your clinic gave you. (If no instructions, use regular soap.)  Don't shave or clip hair near your surgery site. We'll remove the hair if needed.  Don't smoke or vape the morning of surgery. No chewing tobacco for 6 hours before you arrive. A nicotine patch is okay. You may spit out nicotine gum when you arrive.  For some surgeries, the surgeon will tell you to fully quit smoking and nicotine.  We will make every effort to keep you safe from infection. We will:  Clean our hands often with soap and water (or an alcohol-based hand rub).  Clean the skin at your surgery site with a special soap that kills germs.  Give you a special gown to keep you warm. (Cold raises the risk of  infection.)  Wear hair covers, masks, gowns, and gloves during surgery.  Give antibiotic medicine, if prescribed. Not all surgeries need this medicine.  What to bring on the day of surgery  Photo ID and insurance card  Copy of your health care directive, if you have one  Glasses and hearing aids (bring cases)  You can't wear contacts during surgery  Inhaler and eye drops, if you use them (tell us about these when you arrive)  CPAP machine or breathing device, if you use them  A few personal items, if spending the night  If you have . . .  A pacemaker, ICD (cardiac defibrillator), or other implant: Bring the ID card.  An implanted stimulator: Bring the remote control.  A legal guardian: Bring a copy of the certified (court-stamped) guardianship papers.  Please remove any jewelry, including body piercings. Leave jewelry and other valuables at home.  If you're going home the day of surgery  You must have a responsible adult drive you home. They should stay with you overnight as well.  If you don't have someone to stay with you, and you aren't safe to go home alone, we may keep you overnight. Insurance often won't pay for this.  After surgery  If it's hard to control your pain or you need more pain medicine, please call your surgeon's office.  Questions?   If you have any questions for your care team, list them here:   ____________________________________________________________________________________________________________________________________________________________________________________________________________________________________________________________  For informational purposes only. Not to replace the advice of your health care provider. Copyright   2003, 2019 [facility]. All rights reserved. Clinically reviewed by Kwaku Vasquez MD. [employer] [id number] - REV 08/24.

## 2024-11-18 ENCOUNTER — TRANSFERRED RECORDS (OUTPATIENT)
Dept: HEALTH INFORMATION MANAGEMENT | Facility: CLINIC | Age: 44
End: 2024-11-18
Payer: COMMERCIAL

## 2025-01-07 ENCOUNTER — ANCILLARY PROCEDURE (OUTPATIENT)
Dept: MAMMOGRAPHY | Facility: CLINIC | Age: 45
End: 2025-01-07
Payer: COMMERCIAL

## 2025-01-07 DIAGNOSIS — Z12.31 VISIT FOR SCREENING MAMMOGRAM: ICD-10-CM

## 2025-01-07 PROCEDURE — 77063 BREAST TOMOSYNTHESIS BI: CPT | Mod: TC | Performed by: RADIOLOGY

## 2025-01-07 PROCEDURE — 77067 SCR MAMMO BI INCL CAD: CPT | Mod: TC | Performed by: RADIOLOGY

## 2025-02-06 ENCOUNTER — TRANSFERRED RECORDS (OUTPATIENT)
Dept: HEALTH INFORMATION MANAGEMENT | Facility: CLINIC | Age: 45
End: 2025-02-06
Payer: COMMERCIAL

## 2025-04-16 ENCOUNTER — TRANSFERRED RECORDS (OUTPATIENT)
Dept: HEALTH INFORMATION MANAGEMENT | Facility: CLINIC | Age: 45
End: 2025-04-16
Payer: COMMERCIAL

## 2025-07-19 ENCOUNTER — HEALTH MAINTENANCE LETTER (OUTPATIENT)
Age: 45
End: 2025-07-19